# Patient Record
Sex: MALE | Race: WHITE | Employment: OTHER | URBAN - METROPOLITAN AREA
[De-identification: names, ages, dates, MRNs, and addresses within clinical notes are randomized per-mention and may not be internally consistent; named-entity substitution may affect disease eponyms.]

---

## 2021-04-12 ENCOUNTER — HOSPITAL ENCOUNTER (INPATIENT)
Age: 71
LOS: 4 days | Discharge: HOME | DRG: 293 | End: 2021-04-16
Attending: EMERGENCY MEDICINE | Admitting: INTERNAL MEDICINE
Payer: MEDICARE

## 2021-04-12 ENCOUNTER — APPOINTMENT (OUTPATIENT)
Dept: GENERAL RADIOLOGY | Age: 71
DRG: 293 | End: 2021-04-12
Payer: MEDICARE

## 2021-04-12 DIAGNOSIS — I48.91 ATRIAL FIBRILLATION WITH RAPID VENTRICULAR RESPONSE (HCC): Primary | ICD-10-CM

## 2021-04-12 DIAGNOSIS — J81.0 ACUTE PULMONARY EDEMA (HCC): ICD-10-CM

## 2021-04-12 LAB
A/G RATIO: 1 (ref 1.1–2.2)
ALBUMIN SERPL-MCNC: 3.4 G/DL (ref 3.4–5)
ALP BLD-CCNC: 104 U/L (ref 40–129)
ALT SERPL-CCNC: 49 U/L (ref 10–40)
ANION GAP SERPL CALCULATED.3IONS-SCNC: 14 MMOL/L (ref 3–16)
APTT: 32.2 SEC (ref 24.2–36.2)
AST SERPL-CCNC: 33 U/L (ref 15–37)
BASOPHILS ABSOLUTE: 0.1 K/UL (ref 0–0.2)
BASOPHILS RELATIVE PERCENT: 0.5 %
BILIRUB SERPL-MCNC: 0.8 MG/DL (ref 0–1)
BUN BLDV-MCNC: 10 MG/DL (ref 7–20)
CALCIUM SERPL-MCNC: 8.4 MG/DL (ref 8.3–10.6)
CHLORIDE BLD-SCNC: 105 MMOL/L (ref 99–110)
CO2: 21 MMOL/L (ref 21–32)
CREAT SERPL-MCNC: 0.7 MG/DL (ref 0.8–1.3)
EOSINOPHILS ABSOLUTE: 0.1 K/UL (ref 0–0.6)
EOSINOPHILS RELATIVE PERCENT: 1 %
GFR AFRICAN AMERICAN: >60
GFR NON-AFRICAN AMERICAN: >60
GLOBULIN: 3.3 G/DL
GLUCOSE BLD-MCNC: 144 MG/DL (ref 70–99)
HCT VFR BLD CALC: 42.6 % (ref 40.5–52.5)
HEMOGLOBIN: 14.7 G/DL (ref 13.5–17.5)
INR BLD: 1.1 (ref 0.86–1.14)
LACTIC ACID, SEPSIS: 1.3 MMOL/L (ref 0.4–1.9)
LV EF: 48 %
LVEF MODALITY: NORMAL
LYMPHOCYTES ABSOLUTE: 0.9 K/UL (ref 1–5.1)
LYMPHOCYTES RELATIVE PERCENT: 9.2 %
MCH RBC QN AUTO: 30.6 PG (ref 26–34)
MCHC RBC AUTO-ENTMCNC: 34.6 G/DL (ref 31–36)
MCV RBC AUTO: 88.6 FL (ref 80–100)
MONOCYTES ABSOLUTE: 1 K/UL (ref 0–1.3)
MONOCYTES RELATIVE PERCENT: 10 %
NEUTROPHILS ABSOLUTE: 7.6 K/UL (ref 1.7–7.7)
NEUTROPHILS RELATIVE PERCENT: 79.3 %
PDW BLD-RTO: 14.2 % (ref 12.4–15.4)
PLATELET # BLD: 169 K/UL (ref 135–450)
PMV BLD AUTO: 9.2 FL (ref 5–10.5)
POTASSIUM REFLEX MAGNESIUM: 4.4 MMOL/L (ref 3.5–5.1)
PRO-BNP: 4011 PG/ML (ref 0–124)
PROTHROMBIN TIME: 12.8 SEC (ref 10–13.2)
RBC # BLD: 4.82 M/UL (ref 4.2–5.9)
SARS-COV-2, NAAT: NOT DETECTED
SODIUM BLD-SCNC: 140 MMOL/L (ref 136–145)
TOTAL PROTEIN: 6.7 G/DL (ref 6.4–8.2)
TROPONIN: <0.01 NG/ML
TROPONIN: <0.01 NG/ML
TSH REFLEX FT4: 2.13 UIU/ML (ref 0.27–4.2)
WBC # BLD: 9.6 K/UL (ref 4–11)

## 2021-04-12 PROCEDURE — 93306 TTE W/DOPPLER COMPLETE: CPT

## 2021-04-12 PROCEDURE — 36415 COLL VENOUS BLD VENIPUNCTURE: CPT

## 2021-04-12 PROCEDURE — 96365 THER/PROPH/DIAG IV INF INIT: CPT

## 2021-04-12 PROCEDURE — 85025 COMPLETE CBC W/AUTO DIFF WBC: CPT

## 2021-04-12 PROCEDURE — 6360000002 HC RX W HCPCS: Performed by: INTERNAL MEDICINE

## 2021-04-12 PROCEDURE — 71046 X-RAY EXAM CHEST 2 VIEWS: CPT

## 2021-04-12 PROCEDURE — 96374 THER/PROPH/DIAG INJ IV PUSH: CPT

## 2021-04-12 PROCEDURE — 80053 COMPREHEN METABOLIC PANEL: CPT

## 2021-04-12 PROCEDURE — 84484 ASSAY OF TROPONIN QUANT: CPT

## 2021-04-12 PROCEDURE — 2500000003 HC RX 250 WO HCPCS: Performed by: INTERNAL MEDICINE

## 2021-04-12 PROCEDURE — 87040 BLOOD CULTURE FOR BACTERIA: CPT

## 2021-04-12 PROCEDURE — 87635 SARS-COV-2 COVID-19 AMP PRB: CPT

## 2021-04-12 PROCEDURE — 85610 PROTHROMBIN TIME: CPT

## 2021-04-12 PROCEDURE — 99223 1ST HOSP IP/OBS HIGH 75: CPT | Performed by: INTERNAL MEDICINE

## 2021-04-12 PROCEDURE — 83605 ASSAY OF LACTIC ACID: CPT

## 2021-04-12 PROCEDURE — 2500000003 HC RX 250 WO HCPCS: Performed by: EMERGENCY MEDICINE

## 2021-04-12 PROCEDURE — 94760 N-INVAS EAR/PLS OXIMETRY 1: CPT

## 2021-04-12 PROCEDURE — 6370000000 HC RX 637 (ALT 250 FOR IP): Performed by: INTERNAL MEDICINE

## 2021-04-12 PROCEDURE — 2580000003 HC RX 258: Performed by: EMERGENCY MEDICINE

## 2021-04-12 PROCEDURE — 83880 ASSAY OF NATRIURETIC PEPTIDE: CPT

## 2021-04-12 PROCEDURE — 2580000003 HC RX 258: Performed by: INTERNAL MEDICINE

## 2021-04-12 PROCEDURE — 99285 EMERGENCY DEPT VISIT HI MDM: CPT

## 2021-04-12 PROCEDURE — 93005 ELECTROCARDIOGRAM TRACING: CPT | Performed by: EMERGENCY MEDICINE

## 2021-04-12 PROCEDURE — 2060000000 HC ICU INTERMEDIATE R&B

## 2021-04-12 PROCEDURE — 96376 TX/PRO/DX INJ SAME DRUG ADON: CPT

## 2021-04-12 PROCEDURE — 84443 ASSAY THYROID STIM HORMONE: CPT

## 2021-04-12 PROCEDURE — 85730 THROMBOPLASTIN TIME PARTIAL: CPT

## 2021-04-12 RX ORDER — ACETAMINOPHEN 650 MG/1
650 SUPPOSITORY RECTAL EVERY 6 HOURS PRN
Status: DISCONTINUED | OUTPATIENT
Start: 2021-04-12 | End: 2021-04-16 | Stop reason: HOSPADM

## 2021-04-12 RX ORDER — POLYETHYLENE GLYCOL 3350 17 G/17G
17 POWDER, FOR SOLUTION ORAL DAILY PRN
Status: DISCONTINUED | OUTPATIENT
Start: 2021-04-12 | End: 2021-04-16 | Stop reason: HOSPADM

## 2021-04-12 RX ORDER — POTASSIUM CHLORIDE 7.45 MG/ML
10 INJECTION INTRAVENOUS PRN
Status: DISCONTINUED | OUTPATIENT
Start: 2021-04-12 | End: 2021-04-16 | Stop reason: HOSPADM

## 2021-04-12 RX ORDER — ACETAMINOPHEN 325 MG/1
650 TABLET ORAL EVERY 6 HOURS PRN
Status: DISCONTINUED | OUTPATIENT
Start: 2021-04-12 | End: 2021-04-16 | Stop reason: HOSPADM

## 2021-04-12 RX ORDER — SODIUM CHLORIDE 0.9 % (FLUSH) 0.9 %
5-40 SYRINGE (ML) INJECTION EVERY 12 HOURS SCHEDULED
Status: DISCONTINUED | OUTPATIENT
Start: 2021-04-12 | End: 2021-04-13

## 2021-04-12 RX ORDER — FUROSEMIDE 10 MG/ML
40 INJECTION INTRAMUSCULAR; INTRAVENOUS 2 TIMES DAILY
Status: DISCONTINUED | OUTPATIENT
Start: 2021-04-12 | End: 2021-04-13

## 2021-04-12 RX ORDER — MAGNESIUM SULFATE IN WATER 40 MG/ML
2000 INJECTION, SOLUTION INTRAVENOUS PRN
Status: DISCONTINUED | OUTPATIENT
Start: 2021-04-12 | End: 2021-04-16 | Stop reason: HOSPADM

## 2021-04-12 RX ORDER — METOPROLOL SUCCINATE 50 MG/1
50 TABLET, EXTENDED RELEASE ORAL 2 TIMES DAILY
Status: DISCONTINUED | OUTPATIENT
Start: 2021-04-13 | End: 2021-04-16

## 2021-04-12 RX ORDER — POTASSIUM CHLORIDE 20 MEQ/1
40 TABLET, EXTENDED RELEASE ORAL PRN
Status: DISCONTINUED | OUTPATIENT
Start: 2021-04-12 | End: 2021-04-16 | Stop reason: HOSPADM

## 2021-04-12 RX ORDER — PROMETHAZINE HYDROCHLORIDE 25 MG/1
12.5 TABLET ORAL EVERY 6 HOURS PRN
Status: DISCONTINUED | OUTPATIENT
Start: 2021-04-12 | End: 2021-04-16 | Stop reason: HOSPADM

## 2021-04-12 RX ORDER — FUROSEMIDE 10 MG/ML
20 INJECTION INTRAMUSCULAR; INTRAVENOUS ONCE
Status: COMPLETED | OUTPATIENT
Start: 2021-04-12 | End: 2021-04-12

## 2021-04-12 RX ORDER — SPIRONOLACTONE 25 MG/1
25 TABLET ORAL DAILY
Status: DISCONTINUED | OUTPATIENT
Start: 2021-04-12 | End: 2021-04-16 | Stop reason: HOSPADM

## 2021-04-12 RX ORDER — SODIUM CHLORIDE 0.9 % (FLUSH) 0.9 %
5-40 SYRINGE (ML) INJECTION PRN
Status: DISCONTINUED | OUTPATIENT
Start: 2021-04-12 | End: 2021-04-13

## 2021-04-12 RX ORDER — DILTIAZEM HYDROCHLORIDE 5 MG/ML
10 INJECTION INTRAVENOUS ONCE
Status: COMPLETED | OUTPATIENT
Start: 2021-04-12 | End: 2021-04-12

## 2021-04-12 RX ORDER — ONDANSETRON 2 MG/ML
4 INJECTION INTRAMUSCULAR; INTRAVENOUS EVERY 6 HOURS PRN
Status: DISCONTINUED | OUTPATIENT
Start: 2021-04-12 | End: 2021-04-16 | Stop reason: HOSPADM

## 2021-04-12 RX ORDER — SODIUM CHLORIDE 9 MG/ML
25 INJECTION, SOLUTION INTRAVENOUS PRN
Status: DISCONTINUED | OUTPATIENT
Start: 2021-04-12 | End: 2021-04-13

## 2021-04-12 RX ORDER — METOPROLOL SUCCINATE 50 MG/1
50 TABLET, EXTENDED RELEASE ORAL DAILY
Status: DISCONTINUED | OUTPATIENT
Start: 2021-04-12 | End: 2021-04-12

## 2021-04-12 RX ORDER — LISINOPRIL 20 MG/1
20 TABLET ORAL DAILY
Status: DISCONTINUED | OUTPATIENT
Start: 2021-04-12 | End: 2021-04-16 | Stop reason: HOSPADM

## 2021-04-12 RX ADMIN — FUROSEMIDE 20 MG: 10 INJECTION, SOLUTION INTRAMUSCULAR; INTRAVENOUS at 10:25

## 2021-04-12 RX ADMIN — SPIRONOLACTONE 25 MG: 25 TABLET ORAL at 15:56

## 2021-04-12 RX ADMIN — SODIUM CHLORIDE, PRESERVATIVE FREE 10 ML: 5 INJECTION INTRAVENOUS at 21:16

## 2021-04-12 RX ADMIN — METOPROLOL SUCCINATE 50 MG: 50 TABLET, EXTENDED RELEASE ORAL at 15:57

## 2021-04-12 RX ADMIN — DILTIAZEM HYDROCHLORIDE 5 MG/HR: 5 INJECTION INTRAVENOUS at 18:59

## 2021-04-12 RX ADMIN — DILTIAZEM HYDROCHLORIDE 10 MG: 5 INJECTION INTRAVENOUS at 06:25

## 2021-04-12 RX ADMIN — APIXABAN 5 MG: 5 TABLET, FILM COATED ORAL at 15:55

## 2021-04-12 RX ADMIN — APIXABAN 5 MG: 5 TABLET, FILM COATED ORAL at 21:16

## 2021-04-12 RX ADMIN — ENOXAPARIN SODIUM 40 MG: 40 INJECTION SUBCUTANEOUS at 10:25

## 2021-04-12 RX ADMIN — FUROSEMIDE 40 MG: 10 INJECTION, SOLUTION INTRAMUSCULAR; INTRAVENOUS at 15:55

## 2021-04-12 RX ADMIN — SODIUM CHLORIDE, PRESERVATIVE FREE 10 ML: 5 INJECTION INTRAVENOUS at 10:29

## 2021-04-12 RX ADMIN — DILTIAZEM HYDROCHLORIDE 10 MG/HR: 5 INJECTION INTRAVENOUS at 06:25

## 2021-04-12 RX ADMIN — LISINOPRIL 20 MG: 20 TABLET ORAL at 15:55

## 2021-04-12 ASSESSMENT — PAIN SCALES - GENERAL
PAINLEVEL_OUTOF10: 0

## 2021-04-12 ASSESSMENT — PAIN DESCRIPTION - DESCRIPTORS: DESCRIPTORS: SHOOTING

## 2021-04-12 ASSESSMENT — PAIN DESCRIPTION - LOCATION: LOCATION: HEAD

## 2021-04-12 NOTE — H&P
deformity, nasal mucosa and turbinates normal without polyps  Neck: supple and non-tender without mass, no thyromegaly or thyroid nodules, no cervical lymphadenopathy  Pulmonary/Chest: clear to auscultation bilaterally- no wheezes, rales or rhonchi, normal air movement, no respiratory distress  Cardiovascular: normal rate, irregular rhythm, normal S1 and S2, no murmurs, rubs, clicks, or gallops, Peripheral pulses good, Cap refill <3 sec, no carotid bruits  Abdomen: soft, non-tender, non-distended, normal bowel sounds, no masses or organomegaly  Extremities: no cyanosis, clubbing trace edema  Musculoskeletal: normal range of motion, no joint swelling, deformity or tenderness  Neurologic: reflexes normal and symmetric, no cranial nerve deficit, gait, coordination and speech normal      LABS:    CXR:  I have reviewed the CXR with the following interpretation: Basilar infiltrate    EKG: Not up in the system. Per ER note A. fib with RVR    CBC   Recent Labs     04/12/21  0602   WBC 9.6   HGB 14.7   HCT 42.6         RENAL  Recent Labs     04/12/21  0602      K 4.4      CO2 21   BUN 10   CREATININE 0.7*     LFT'S  Recent Labs     04/12/21  0602   AST 33   ALT 49*   BILITOT 0.8   ALKPHOS 104     COAG  No results for input(s): INR in the last 72 hours. CARDIAC ENZYMES  Recent Labs     04/12/21  0602   TROPONINI <0.01       U/A:  No results found for: NITRITE, COLORU, WBCUA, RBCUA, MUCUS, BACTERIA, CLARITYU, SPECGRAV, LEUKOCYTESUR, BLOODU, GLUCOSEU, AMORPHOUS    ABG  No results found for: YWP1HFT, BEART, A9NGIBWF, PHART, THGBART, THY5MHU, PO2ART, RMK1BIT    UA:No results for input(s): NITRITE, COLORU, PHUR, LABCAST, WBCUA, RBCUA, MUCUS, TRICHOMONAS, YEAST, BACTERIA, CLARITYU, SPECGRAV, LEUKOCYTESUR, UROBILINOGEN, BILIRUBINUR, BLOODU, GLUCOSEU, KETUA, AMORPHOUS in the last 72 hours. Microbiology:  No results for input(s): LABGRAM, LABANAE, ORG, CXSURG in the last 72 hours.   Nasal Culture: No results for input(s): ORG, MRSAPCR in the last 72 hours. Blood Culture: No results for input(s): BC, BLOODCULT2, ORG in the last 72 hours. Fungal Culture:   No results for input(s): FUNGSM in the last 72 hours. No results for input(s): FUNCXBLD in the last 72 hours. CSF Culture:  No results for input(s): COLORCSF, APPEARCSF, CFTUBE, CLOTCSF, WBCCSF, RBCCSF, NEUTCSF, NUMCELLSCSF, LYMPHSCSF, MONOCSF, GLUCCSF, VOLCSF in the last 72 hours. Respiratory Culture:  No results for input(s): Mauriec Banister in the last 72 hours. AFB:No results for input(s): AFBSMEAR in the last 72 hours. Urine Culture  No results for input(s): LABURIN in the last 72 hours. RADIOLOGY:    XR CHEST (2 VW)   Final Result   Bibasilar airspace disease most consistent with pneumonia. Previous medical records personally reviewed and analyzed         PHYSICIAN CERTIFICATION    I certify that Joseluis Giang is expected to be hospitalized for > 2  midnights based on the following assessment and plan:    ASSESSMENT/PLAN:  Active Hospital Problems    Diagnosis Date Noted    Atrial fibrillation with RVR ([facility] [address] [I48.91] 04/12/2021     New onset A. fib with RVR with CHF exacerbation  -Patient started on a Cardizem drip, will wean as tolerated  -Echo ordered to evaluate EF  -Anticoagulation to be decided after work-up is complete  -Shortness of breath possibly due to RVR but with elevated BNP will start IV Lasix to treat for possible CHF. Unclear what her ejection fraction is.   monitor DORIAN  -TSH  -Troponin    DVT Prophylaxis: lovenox  Diet: DIET CARDIAC; Daily Fluid Restriction: 1800 ml  Code Status: Full Code      Dispo -PCU       King Rubio MD  The note was completed using EMR. Every effort was made to ensure accuracy; however, inadvertent computerized transcription errors may be present. Thank you No primary care provider on file. for the opportunity to be involved in this patient's care.  If you have any questions or concerns please feel free to contact me at [phone].

## 2021-04-12 NOTE — PLAN OF CARE
Problem:  Activity:  Goal: Ability to tolerate increased activity will improve  Description: Ability to tolerate increased activity will improve  Outcome: Ongoing     Problem: Cardiac:  Goal: Ability to maintain an adequate cardiac output will improve  Description: Ability to maintain an adequate cardiac output will improve  Outcome: Ongoing  Goal: Complications related to the disease process, condition or treatment will be avoided or minimized  Description: Complications related to the disease process, condition or treatment will be avoided or minimized  Outcome: Ongoing     Problem: Coping:  Goal: Level of anxiety will decrease  Description: Level of anxiety will decrease  Outcome: Ongoing  Goal: General experience of comfort will improve  Description: General experience of comfort will improve  Outcome: Ongoing     Problem: Health Behavior:  Goal: Ability to manage health-related needs will improve  Description: Ability to manage health-related needs will improve  Outcome: Ongoing     Problem: Safety:  Goal: Ability to remain free from injury will improve  Description: Ability to remain free from injury will improve  Outcome: Ongoing  Goal: Will show no signs and symptoms of excessive bleeding  Description: Will show no signs and symptoms of excessive bleeding  Outcome: Ongoing

## 2021-04-12 NOTE — ED NOTES
Triage made by this RN in error. Complaint not foot.   Complaint SOB     Naga Casillas [facility]  04/12/21 0030

## 2021-04-12 NOTE — CARE COORDINATION
INITIAL CASE MANAGEMENT ASSESSMENT    Reviewed chart, met with patient to assess possible discharge needs. Explained Case Management role/services. Living Situation: Confirmed address, lives alone in 2 level house, 3 steps w/ railing to enter    ADLs: Independent     DME: None - has old equipment from spouse that passed aware - walker,cane    PT/OT Recs: Not ordered     Active Services: None     Transportation: Active  - family can transport home     Medications: Uses [facility] on [address] - no issues    PCP: Florian Sidhu (hasn't seen MD in years) - new [facility] PCP list given as pt interested in finding new PCP      HD/PD: n/a    PLAN/COMMENTS: Patient plans on returning home at discharge. Denies needs. Family can transport home. CM provided contact information for patient or family to call with any questions. CM will follow and assist as needed.   Electronically signed by Phyllis Georges RN Case Management [phone] on 4/12/2021 at 12:59 PM

## 2021-04-12 NOTE — ED PROVIDER NOTES
[address] [facility]      Pt Name: Saeed Akins  MRN: [id number]  [place] 1950  Date of evaluation: 4/12/2021  Provider: Nixon Lock MD    CHIEF COMPLAINT       Chief Complaint   Patient presents with    Shortness of Breath     sob for approx 2 days         HISTORY OF PRESENT ILLNESS   (Location/Symptom, Timing/Onset,Context/Setting, Quality, Duration, Modifying Factors, Severity)  Note limiting factors. Saeed Akins is a 72 y.o. male who presents to the emergency department for evaluation of shortness of breath. Patient reports that he has had some mild shortness of breath for the past several days since the beginning of the weekend. He states that the shortness of breath is actually worse when he is lying flat, but has become present throughout the day today. He walked into the fire house for assistance this morning because of his breathing. A squad brought him to the emergency department, and in route obtain a twelve-lead EKG which was consistent with atrial fibrillation. Patient denies any history of A. fib or any other medical problems but has not seen a physician for approximately 20 years. He denies any chest pain recently and has not had any heart palpitation. Denies any recent cough or fever. No abdominal pain, nausea or vomiting. NursingNotes were reviewed. REVIEW OF SYSTEMS    (2-9 systems for level 4, 10 or more for level 5)       Constitutional: No fever or chills. Eye: No visual disturbances. No eye pain. Ear/Nose/Mouth/Throat: No nasal congestion. No sore throat. Respiratory: No cough, shortness of breath, No sputum production. Orthopnea. Cardiovascular: No chest pain. No palpitations. Gastrointestinal: No abdominal pain. No nausea or vomiting  Genitourinary: No dysuria. No hematuria. Hematology/Lymphatics: No bleeding or bruising tendency. Immunologic: No malaise.  No swollen glands. Musculoskeletal: No back pain. No joint pain. Integumentary: No rash. No abrasions. Neurologic: No headache. No focal numbness or weakness. PAST MEDICAL HISTORY   No past medical history on file. SURGICALHISTORY     No past surgical history on file. CURRENT MEDICATIONS       Previous Medications    No medications on file       ALLERGIES     Patient has no known allergies. FAMILY HISTORY     No family history on file.        SOCIAL HISTORY       Social History     Socioeconomic History    Marital status: Single     Spouse name: Not on file    Number of children: Not on file    Years of education: Not on file    Highest education level: Not on file   Occupational History    Not on file   Social Needs    Financial resource strain: Not on file    Food insecurity     Worry: Not on file     Inability: Not on file    Transportation needs     Medical: Not on file     Non-medical: Not on file   Tobacco Use    Smoking status: Not on file   Substance and Sexual Activity    Alcohol use: Not on file    Drug use: Not on file    Sexual activity: Not on file   Lifestyle    Physical activity     Days per week: Not on file     Minutes per session: Not on file    Stress: Not on file   Relationships    Social connections     Talks on phone: Not on file     Gets together: Not on file     Attends [employer] service: Not on file     Active member of club or organization: Not on file     Attends meetings of clubs or organizations: Not on file     Relationship status: Not on file    Intimate partner violence     Fear of current or ex partner: Not on file     Emotionally abused: Not on file     Physically abused: Not on file     Forced sexual activity: Not on file   Other Topics Concern    Not on file   Social History Narrative    Not on file       SCREENINGS    Jeffry Coma Scale  Eye Opening: Spontaneous  Best Verbal Response: Oriented  Best Motor Response: Obeys commands  Kent Coma Scale Score: 15        PHYSICAL EXAM    (up to 7 for level 4, 8 or more for level 5)     ED Triage Vitals   BP Temp Temp src Pulse Resp SpO2 Height Weight   -- -- -- -- -- -- -- --       General: Alert and oriented, No acute distress. Eye: Normal conjunctiva. Pupils equal and reactive. HENT: Oral mucosa is moist.  Respiratory: Lungs are clear to auscultation, Respirations are non-labored. Cardiovascular: Normal rate, Regular rhythm. Gastrointestinal: Soft, Non-tender, Non-distended. Musculoskeletal: No lower extremity swelling. Integumentary: Warm, Dry. Neurologic: Alert, Oriented, No focal defects. Psychiatric: Cooperative. DIAGNOSTIC RESULTS     EKG: All EKG's are interpreted by the Emergency Department Physician who either signs or Co-signsthis chart in the absence of a cardiologist.    Per my interpretation:    Electrocardiogram (ECG) 4/12/2021 5:55 AM  RATE: 152 bpm  RHYTHM: Atrial fibrillation with RVR  AXIS: normal  INTERVALS: normal  ST-T WAVE CHANGES: No evidence of ST segment elevation or T-wave inversion, nonspecific ST abnormality  Prior for comparison - none    RADIOLOGY:   Non-plain filmimages such as CT, Ultrasound and MRI are read by the radiologist. Plain radiographic images are visualized and preliminarily interpreted by the emergency physician with the below findings:      Interpretation per the Radiologist below, if available at the time ofthis note:    XR CHEST (2 VW)   Final Result   Bibasilar airspace disease most consistent with pneumonia.                ED BEDSIDE ULTRASOUND:   Performed by ED Physician - none    LABS:  Labs Reviewed   CBC WITH AUTO DIFFERENTIAL - Abnormal; Notable for the following components:       Result Value    Lymphocytes Absolute 0.9 (*)     All other components within normal limits    Narrative:     Performed at:  [address] [address]   Phone [phone]   COMPREHENSIVE METABOLIC PANEL W/ REFLEX TO MG FOR LOW K - Abnormal; Notable for the following components:    Glucose 144 (*)     CREATININE 0.7 (*)     Albumin/Globulin Ratio 1.0 (*)     ALT 49 (*)     All other components within normal limits    Narrative:     Performed at:  [facility]  [address] [facility] [address]   Phone [phone]   BRAIN NATRIURETIC PEPTIDE - Abnormal; Notable for the following components:    Pro-BNP 4,011 (*)     All other components within normal limits    Narrative:     Performed at:  [facility]  [address] [place], [place] [address]   Phone [phone]   COVID-19, RAPID   CULTURE, BLOOD 1   TROPONIN    Narrative:     Performed at:  [facility]  [address] [facility] [address]   Phone [phone]   PROTIME-INR   APTT   LACTATE, SEPSIS   LACTATE, SEPSIS   PROCALCITONIN       All other labs were within normal range or not returned as of this dictation. EMERGENCY DEPARTMENT COURSE and DIFFERENTIAL DIAGNOSIS/MDM:   Vitals:    Vitals:    04/12/21 0558 04/12/21 0602 04/12/21 0603   BP:  (!) 156/103 (!) 156/103   Pulse: 133 126    Resp: 20 23    Temp: 99.1 °F (37.3 °C)     TempSrc: Oral     SpO2: 94% 94%    Weight: 272 lb 11.3 oz (123.7 kg)     Height: 6' 5\" (1.956 m)           Medical decision making: This is a 71-year-old male with no known past medical history but who does not follow with primary care and presents for evaluation of worsening shortness of breath for the past several days, found to have atrial fibrillation by EMS. On exam, patient is well-appearing, has a low-grade temperature of 99.1, noted to be tachycardic with regular rhythm, ranging 140s to 150s initially. Normal blood pressure and oxygen saturation. EKG is consistent with atrial fibrillation with rapid ventricular response. Patient is started on diltiazem drip and bolus. Work-up was initiated.   CBC is unremarkable. CMP is reassuring. Troponin is negative. BNP is elevated to 4000. Chest x-ray shows bibasilar airspace disease disease which radiology interprets as consistent with pneumonia however on my interpretation of x-ray, coupled with clinical examination I suspect this is more likely to be pulmonary edema related to his atrial fibrillation with RVR. Patient denies any recent cough or fever. I did however add blood culture, lactate, procalcitonin, and rapid Covid test was sent. Will defer IV fluids and antibiotics till results of the study. The patient had adequate control of heart rate on the diltiazem drip. [id number] score of 0, though limited because history of CHF and hypertension are unknown, however will defer to inpatient team for anticoagulation. Otherwise, patient will be admitted for newly diagnosed atrial fibrillation with RVR. Patient was updated and agreeable plan of care, stable at the time of admission. CRITICAL CARE TIME   Total Critical Care time was 32 minutes, excluding separately reportable procedures. There was a high probability of clinically significant/life threatening deterioration in the patient's condition which required my urgent intervention. CONSULTS:  None    PROCEDURES:  Unless otherwise noted below, none         FINAL IMPRESSION      1. Atrial fibrillation with rapid ventricular response ([address]    2. Acute pulmonary edema (HCC)          DISPOSITION/PLAN   DISPOSITION  Admit      PATIENT REFERRED TO:  No follow-up provider specified.     DISCHARGE MEDICATIONS:  New Prescriptions    No medications on file          (Please note that portions of this note were completed with a voice recognition program.Efforts were made to edit the dictations but occasionally words are mis-transcribed.)    Annette Park MD (electronically signed)  Attending Emergency Physician        Annette Park MD  04/12/21 7470

## 2021-04-12 NOTE — CONSULTS
[address]  Cardiology Consult Note        CC: Atrial fibrillation with RVR      HPI:   This is a 72 y.o. male came to the ER for evaluation of shortness of breath that has become progressively worse since the last few days. His breathing is worse when he lays down and when he exerts himself. He went to the fire house and the squad brought him here. EKG showed A. fib    In the ER his EKG showed heart rate in the 150 range with A. fib. He was started on Cardizem drip. Chest x-ray showed CHF.        Social History     Tobacco Use    Smoking status: Never Smoker    Smokeless tobacco: Never Used   Substance Use Topics    Alcohol use: Not on file    Drug use: Not on file     No Known Allergies   sodium chloride flush  5-40 mL Intravenous 2 times per day    enoxaparin  40 mg Subcutaneous Daily       Review of Systems -   Constitutional: Negative for weight gain/loss; malaise, fever  Respiratory: Negative for Asthma;  cough and hemoptysis  Cardiovascular: Negative for palpitations,dizziness   Gastrointestinal: Negative for abd.pain; constipation/diarrhea;    Genitourinary: Negative for stones; hematuria; frequency hesitancy  Integumentt: Negative for rash or pruritis  Hematologic/lymphatic: Negative for blood dyscrasia; leukemia/lymphoma  Musculoskeletal: Negative for Connective tissue disease  Neurological:  Negative for Seizure   Behavioral/Psych:Negative for Bipolar disorder, Schizophrenia; Dementia  Endocrine: negative for thyroid, parathyroid disease      Intake/Output Summary (Last 24 hours) at 4/12/2021 1342  Last data filed at 4/12/2021 1213  Gross per 24 hour   Intake --   Output 1200 ml   Net -1200 ml       Physical Examination:    BP (!) 143/90   Pulse 83   Temp 97.6 °F (36.4 °C) (Oral)   Resp 18   Ht 6' 5\" (1.956 m)   Wt 276 lb 0.3 oz (125.2 kg)   SpO2 94%   BMI 32.73 kg/m²    HEENT:  Face: Atraumatic, Conjunctiva: Pink; non icteric,  Mucous Memb:  Moist, No thyromegaly or Lymphadenopathy  Respiratory:  Resp Assessment: normal, Resp Auscultation: Basilar rales  Cardiovascular: Auscultation: nl S1 & S2, Palpation:  Nl PMI; No heaves or thrills, JVP:  normal  Abdomen: Soft, non-tender, Normal bowel sounds,  No organomegaly  Extremities: No Cyanosis or Clubbing; Edema none  Neurological: Oriented to time, place, and person, Non-anxious  Psychiatric: Normal mood and affect  Skin: Warm and dry,  No rash seen      Current Facility-Administered Medications: dilTIAZem 125 mg in dextrose 5 % 125 mL infusion, 5-15 mg/hr, Intravenous, Continuous  sodium chloride flush 0.9 % injection 5-40 mL, 5-40 mL, Intravenous, 2 times per day  sodium chloride flush 0.9 % injection 5-40 mL, 5-40 mL, Intravenous, PRN  0.9 % sodium chloride infusion, 25 mL, Intravenous, PRN  enoxaparin (LOVENOX) injection 40 mg, 40 mg, Subcutaneous, Daily  promethazine (PHENERGAN) tablet 12.5 mg, 12.5 mg, Oral, Q6H PRN **OR** ondansetron (ZOFRAN) injection 4 mg, 4 mg, Intravenous, Q6H PRN  polyethylene glycol (GLYCOLAX) packet 17 g, 17 g, Oral, Daily PRN  acetaminophen (TYLENOL) tablet 650 mg, 650 mg, Oral, Q6H PRN **OR** acetaminophen (TYLENOL) suppository 650 mg, 650 mg, Rectal, Q6H PRN  potassium chloride (KLOR-CON M) extended release tablet 40 mEq, 40 mEq, Oral, PRN **OR** potassium bicarb-citric acid (EFFER-K) effervescent tablet 40 mEq, 40 mEq, Oral, PRN **OR** potassium chloride 10 mEq/100 mL IVPB (Peripheral Line), 10 mEq, Intravenous, PRN  magnesium sulfate 2000 mg in 50 mL IVPB premix, 2,000 mg, Intravenous, PRN      Labs:   Recent Labs     04/12/21  0602   WBC 9.6   HGB 14.7   HCT 42.6        Recent Labs     04/12/21  0602      K 4.4   CO2 21   BUN 10   CREATININE 0.7*   GLUCOSE 144*     No results for input(s): BNP in the last 72 hours.   Recent Labs     04/12/21  0602   PROTIME 12.8   INR 1.10     Recent Labs     04/12/21  0602   APTT 32.2     Recent Labs     04/12/21  0602 04/12/21  1017   TROPONINI <0.01 <0.01     No results found for: HDL, LDLDIRECT, LDLCALC, TRIG  Recent Labs     04/12/21  0602   AST 33   ALT 49*   LABALBU 3.4         EKG:   A. fib with RVR    Chest X-Ray:  CHF    ECHO: 4/12/2021   Suboptimal image quality. Normal left ventricular size and wall thickness. LVEF is difficult to assess  due to arrhythmia but appears mildly reduced with global systolic  dysfunction. LVEF 45-50%. Unable to exclude regional wall motion abnormalities. Indeterminate diastolic function due to atrial fibrillation. The right ventricle is normal in size and function. Mild mitral regurgitation is present.       ASSESSMENT AND PLAN:      26-year-old patient presenting with shortness of breath orthopnea PND  Has A. fib with RVR on admission  Chest x-ray evidence of CHF  proBNP is 4011  Echo showed ejection fraction of 45 to 50% with no major valvular abnormalities    Looks like the primary event may be A. 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

## 2021-04-12 NOTE — PROGRESS NOTES
Patient arrived to room 5255 from ED. Transported by stretcher. Pt ambulated to the bed independently. Telemetry applied to patient and verified with [facility]. Box #[id number]. Vital signs taken, view flow sheets. Patient alert and oriented. Patient oriented to room and use of call light. Patient educated to call before getting up. Call light and personal belongings in reach. Bed alarm on. Plan of care reviewed with patient. Patient denied any further needs and questions at this time. Talked to patient about his heart rate and let him know we will be monitoring his heart rate and the drip he is on and the heart doctor will see him as well.

## 2021-04-12 NOTE — ED NOTES
Bed: B-  Expected date: 4/12/21  Expected time: 5:43 AM  Means of arrival: [facility]  Comments:  Sob; a fib rvr     [address] [facility]  04/12/21 8886

## 2021-04-12 NOTE — PROGRESS NOTES
Pt to echo via stretcher. Gave patient all meds and explained the use of each and given information sheets. Also sister in room and explained to her as well. No further questions.

## 2021-04-13 PROBLEM — I50.21 ACUTE SYSTOLIC HEART FAILURE (HCC): Status: ACTIVE | Noted: 2021-04-13

## 2021-04-13 LAB
ANION GAP SERPL CALCULATED.3IONS-SCNC: 7 MMOL/L (ref 3–16)
BUN BLDV-MCNC: 11 MG/DL (ref 7–20)
CALCIUM SERPL-MCNC: 8 MG/DL (ref 8.3–10.6)
CHLORIDE BLD-SCNC: 106 MMOL/L (ref 99–110)
CO2: 27 MMOL/L (ref 21–32)
CREAT SERPL-MCNC: 0.8 MG/DL (ref 0.8–1.3)
EKG ATRIAL RATE: 134 BPM
EKG DIAGNOSIS: NORMAL
EKG Q-T INTERVAL: 292 MS
EKG QRS DURATION: 76 MS
EKG QTC CALCULATION (BAZETT): 464 MS
EKG R AXIS: 51 DEGREES
EKG T AXIS: 16 DEGREES
EKG VENTRICULAR RATE: 152 BPM
GFR AFRICAN AMERICAN: >60
GFR NON-AFRICAN AMERICAN: >60
GLUCOSE BLD-MCNC: 133 MG/DL (ref 70–99)
HCT VFR BLD CALC: 39.7 % (ref 40.5–52.5)
HEMOGLOBIN: 13.7 G/DL (ref 13.5–17.5)
MCH RBC QN AUTO: 30.5 PG (ref 26–34)
MCHC RBC AUTO-ENTMCNC: 34.5 G/DL (ref 31–36)
MCV RBC AUTO: 88.4 FL (ref 80–100)
PDW BLD-RTO: 13.7 % (ref 12.4–15.4)
PLATELET # BLD: 164 K/UL (ref 135–450)
PMV BLD AUTO: 9.4 FL (ref 5–10.5)
POTASSIUM REFLEX MAGNESIUM: 4.1 MMOL/L (ref 3.5–5.1)
RBC # BLD: 4.49 M/UL (ref 4.2–5.9)
SODIUM BLD-SCNC: 140 MMOL/L (ref 136–145)
WBC # BLD: 6.2 K/UL (ref 4–11)

## 2021-04-13 PROCEDURE — 6360000002 HC RX W HCPCS: Performed by: NURSE PRACTITIONER

## 2021-04-13 PROCEDURE — 2060000000 HC ICU INTERMEDIATE R&B

## 2021-04-13 PROCEDURE — 2580000003 HC RX 258: Performed by: NURSE PRACTITIONER

## 2021-04-13 PROCEDURE — 6360000002 HC RX W HCPCS: Performed by: INTERNAL MEDICINE

## 2021-04-13 PROCEDURE — 2580000003 HC RX 258: Performed by: INTERNAL MEDICINE

## 2021-04-13 PROCEDURE — 6370000000 HC RX 637 (ALT 250 FOR IP): Performed by: INTERNAL MEDICINE

## 2021-04-13 PROCEDURE — 80048 BASIC METABOLIC PNL TOTAL CA: CPT

## 2021-04-13 PROCEDURE — 94760 N-INVAS EAR/PLS OXIMETRY 1: CPT

## 2021-04-13 PROCEDURE — 93010 ELECTROCARDIOGRAM REPORT: CPT | Performed by: INTERNAL MEDICINE

## 2021-04-13 PROCEDURE — 36415 COLL VENOUS BLD VENIPUNCTURE: CPT

## 2021-04-13 PROCEDURE — 85027 COMPLETE CBC AUTOMATED: CPT

## 2021-04-13 PROCEDURE — 99232 SBSQ HOSP IP/OBS MODERATE 35: CPT | Performed by: NURSE PRACTITIONER

## 2021-04-13 RX ORDER — SODIUM CHLORIDE 9 MG/ML
25 INJECTION, SOLUTION INTRAVENOUS PRN
Status: DISCONTINUED | OUTPATIENT
Start: 2021-04-13 | End: 2021-04-13

## 2021-04-13 RX ORDER — SODIUM CHLORIDE 0.9 % (FLUSH) 0.9 %
5-40 SYRINGE (ML) INJECTION EVERY 12 HOURS SCHEDULED
Status: DISCONTINUED | OUTPATIENT
Start: 2021-04-13 | End: 2021-04-13

## 2021-04-13 RX ORDER — SODIUM CHLORIDE 9 MG/ML
25 INJECTION, SOLUTION INTRAVENOUS PRN
Status: DISCONTINUED | OUTPATIENT
Start: 2021-04-13 | End: 2021-04-16 | Stop reason: HOSPADM

## 2021-04-13 RX ORDER — SODIUM CHLORIDE 0.9 % (FLUSH) 0.9 %
5-40 SYRINGE (ML) INJECTION PRN
Status: DISCONTINUED | OUTPATIENT
Start: 2021-04-13 | End: 2021-04-13

## 2021-04-13 RX ORDER — FUROSEMIDE 10 MG/ML
40 INJECTION INTRAMUSCULAR; INTRAVENOUS ONCE
Status: COMPLETED | OUTPATIENT
Start: 2021-04-13 | End: 2021-04-13

## 2021-04-13 RX ORDER — SODIUM CHLORIDE 0.9 % (FLUSH) 0.9 %
5-40 SYRINGE (ML) INJECTION EVERY 12 HOURS SCHEDULED
Status: DISCONTINUED | OUTPATIENT
Start: 2021-04-13 | End: 2021-04-16 | Stop reason: HOSPADM

## 2021-04-13 RX ORDER — FUROSEMIDE 10 MG/ML
40 INJECTION INTRAMUSCULAR; INTRAVENOUS DAILY
Status: DISCONTINUED | OUTPATIENT
Start: 2021-04-14 | End: 2021-04-16 | Stop reason: HOSPADM

## 2021-04-13 RX ORDER — SODIUM CHLORIDE 0.9 % (FLUSH) 0.9 %
5-40 SYRINGE (ML) INJECTION PRN
Status: DISCONTINUED | OUTPATIENT
Start: 2021-04-13 | End: 2021-04-16 | Stop reason: HOSPADM

## 2021-04-13 RX ADMIN — METOPROLOL SUCCINATE 50 MG: 50 TABLET, EXTENDED RELEASE ORAL at 20:50

## 2021-04-13 RX ADMIN — SODIUM CHLORIDE, PRESERVATIVE FREE 10 ML: 5 INJECTION INTRAVENOUS at 08:23

## 2021-04-13 RX ADMIN — Medication 10 ML: at 20:50

## 2021-04-13 RX ADMIN — LISINOPRIL 20 MG: 20 TABLET ORAL at 12:18

## 2021-04-13 RX ADMIN — APIXABAN 5 MG: 5 TABLET, FILM COATED ORAL at 20:50

## 2021-04-13 RX ADMIN — SPIRONOLACTONE 25 MG: 25 TABLET ORAL at 12:18

## 2021-04-13 RX ADMIN — FUROSEMIDE 40 MG: 10 INJECTION, SOLUTION INTRAMUSCULAR; INTRAVENOUS at 17:06

## 2021-04-13 RX ADMIN — METOPROLOL SUCCINATE 50 MG: 50 TABLET, EXTENDED RELEASE ORAL at 08:22

## 2021-04-13 RX ADMIN — FUROSEMIDE 40 MG: 10 INJECTION, SOLUTION INTRAMUSCULAR; INTRAVENOUS at 08:22

## 2021-04-13 RX ADMIN — APIXABAN 5 MG: 5 TABLET, FILM COATED ORAL at 08:22

## 2021-04-13 ASSESSMENT — PAIN SCALES - GENERAL
PAINLEVEL_OUTOF10: 0

## 2021-04-13 NOTE — PLAN OF CARE
Problem: OXYGENATION/RESPIRATORY FUNCTION  Goal: Patient will maintain patent airway  4/13/2021 1333 by Kelly Wright RN  Outcome: Ongoing  Note:       Problem: FLUID AND ELECTROLYTE IMBALANCE  Goal: Fluid and electrolyte balance are achieved/maintained  4/13/2021 1333 by Kelly Wright RN  Outcome: Ongoing  Note:       Problem: ACTIVITY INTOLERANCE/IMPAIRED MOBILITY  Goal: Mobility/activity is maintained at optimum level for patient  4/13/2021 1333 by Kelly Wright RN  Outcome: Ongoing  Note:

## 2021-04-13 NOTE — CONSULTS
[facility]  HEART FAILURE PROGRAM      NAME:  Montrell Lamas  MEDICAL RECORD NUMBER:  [id number]  AGE: 72 y.o. GENDER: male  : 1950  TODAY'S DATE:  2021    Subjective:     VISIT TYPE: evaluation     ADMITTING PHYSICIAN:  Ingrid Duverney, MD    PAST MEDICAL HISTORY    No past medical history on file.     SOCIAL HISTORY    Social History     Tobacco Use    Smoking status: Never Smoker    Smokeless tobacco: Never Used   Substance Use Topics    Alcohol use: Not on file    Drug use: Not on file       ALLERGIES    No Known Allergies    MEDICATIONS  Scheduled Meds:   [START ON 2021] furosemide  40 mg Intravenous Daily    sodium chloride flush  5-40 mL Intravenous 2 times per day    apixaban  5 mg Oral BID    spironolactone  25 mg Oral Daily    lisinopril  20 mg Oral Daily    metoprolol succinate  50 mg Oral BID       ADMIT DATE: 2021      Objective:     ADMISSION DIAGNOSIS:   Atrial fibrillation with RVR ([facility]) [I48.91]     BP (!) 135/91   Pulse 79   Temp 98.3 °F (36.8 °C) (Oral)   Resp 16   Ht 6' 5\" (1.956 m)   Wt 269 lb 6.4 oz (122.2 kg)   SpO2 93%   BMI 31.95 kg/m²     ADMIT:  Weight: 272 lb 11.3 oz (123.7 kg)    TODAY: Weight: 269 lb 6.4 oz (122.2 kg)    Wt Readings from Last 25 Encounters:   21 269 lb 6.4 oz (122.2 kg)          Intake/Output Summary (Last 24 hours) at 2021 1935  Last data filed at 2021 1846  Gross per 24 hour   Intake 1200 ml   Output 2650 ml   Net -1450 ml       LABS  BMP:   Lab Results   Component Value Date     2021    K 4.1 2021     2021    CO2 27 2021    BUN 11 2021    LABALBU 3.4 2021    CREATININE 0.8 2021    CALCIUM 8.0 2021    GFRAA >60 2021    LABGLOM >60 2021    GLUCOSE 133 2021     CBC:   Recent Labs     21  0602 21  0559   WBC 9.6 6.2   HGB 14.7 13.7   HCT 42.6 39.7*   MCV 88.6 88.4    164     BNP: No results found for: BNP    ECHOCARDIOGRAM: 04/12/2021   Summary   The patient is in atrial fibrillation. Suboptimal image quality. Normal left ventricular size and wall thickness. LVEF is difficult to assess   due to arrhythmia but appears mildly reduced with global systolic   dysfunction. LV ejection fraction is visually estimated to be 45-50%. Unable to exclude regional wall motion abnormalities. Indeterminate diastolic function due to atrial fibrillation. The right ventricle is normal in size and function. Mild mitral regurgitation is present. Assessment:     CONSULTS:   IP CONSULT TO CARDIOLOGY  IP CONSULT TO SPIRITUAL SERVICES  IP CONSULT TO HEART FAILURE NURSE/COORDINATOR    Patient has a CARDIOLOGY CONSULT: Yes      Patient taking an ACEI/ARB:  Yes       Patient taking a BETA BLOCKER:  Yes    SCALE AVAILABLE:  Yes     EDUCATION STATUS: Patient   [x]  Provided both written and verbal education on Heart Failure signs/symptoms. [x]  Provided instructions on daily medications. [x]  Provided instructions to monitor and record weight daily. [x]  Provided instructions to call if weight increases 3 lbs in one day or 5 lbs in one week. [x]  Received verbal acknowledgment/understanding of Heart Failure related causes. [x]  Provided instructions on how to maintain a low sodium diet. []  Provided recommendations for smoking cessation programs  [x]  Provided recommendations on activity and exercise    [x]  Other: HF RN consult received from Dr Mark Mayers. Chart reviewed. Pt presented to ED via EMS with c/o SOB. He had driven himself to fire station and transported from there. He endorses leg edema \"for a while\" and orthopnea. En route, EKG revealed AF RVR rate 140-150. In ED, pt was treated with Cardizem gtt and IV Lasix. Pt has not seen an MD in > 20 years. He essentially has no medical history and takes no prescription medications.   He has a PCP who he has not seen \"in a long long time\" per his self report and no established cardiologist.     Dellar Goldmann on admission was 4011 with Cr 0.7. CXR showed edema but ED MD felt it was more indicative of pulm edema. Weight 272# (ED) and 276# (floor). Pt was treated with IV bolus Lasix. Cardiology was consulted. Pt remains in AF but with better rate control  Cardizem gtt has been stopped. Plan is for JAMIA / DCCV 4/14/21. I met with patient in his room. He is up to chair, on room air, and appears comfortable at rest and with light conversation. I introduced myself and my role in his care. He is agreeable to spend time with me for HF education. Pt endorses above summary of events leading to admission. He adds sx have \"been off and on for about a year\". He shares he is quite active -- he mows grass, has construction projects and still works. He states he has had \"zero energy\" which is frustrating to him. He shares he knew things were slowly worsening but they had rapidly progressed over 2-3 days prior to admission. He shares he \"was doing the wrong thing\". He shares he had purposefully increased his fluid intake thinking it was \"the healthy thing to do -- every doctor always tells you to drink water\". He now knows he will have a fluid restriction moving forward. We reviewed his echo results. He has LOTS of questions and wants to know definitions of terms he has heard, treatment plans, etc.  He is very engaged and very interested in learning all he can. He acknowledges he has been fortunate with good health but is aware of the seriousness of this diagnosis. We discussed the general definition of systolic HF and atrial fibrillation. We discussed loss of atrial kick and how HF and AF are intertwined and sometimes difficult to discern which came first.  He is understandably anxious about procedure tomorrow but shares he spoke with a friend who had the same procedure but it was unsuccessful - \"it lasted a day\".   We discussed why DCCV was delayed while he was being \"tuned up / primed\" for cardioversion and that hopefully it was increase the chance of success / lasting effect. We also discussed how people can live with AF and how other treatments are also options -- ie ablation. He is \"willing to do whatever they tell me\". Praise given for positive attitude and willingness to make necessary changes. We discussed the importance of weight monitoring in HF management. Pt has a scale at home and had recently started weighing daily. He shares he does it just before getting in shower each AM.  He states it was 260 then drifted down to 254#. He says he then gained ~ 12# in 2-3 weeks. \"I couldn't figure out what was going on because it wasn't because I was eating more\". He shares his appetite was \"off\" but yet he kept gaining weight. I provided weight log and corresponding AHA HF zones. He already logs his weight and I urged him to continue using whatever method works for him. He was quite interested in reviewing the zones. He endorses all of the yellow zone items except cough. He shares he had \"a lot of the red zone\" and now voices understanding of why he should call 911. He states \"I just didn't want lights and sirens [place]\" but now knows it is that serious. Pt is already aware of dietary changes necessary for HF management. We reviewed the rationale of sodium and fluid restriction and the recommendations of each. Pt shares he is  for last 1 1/2 years - his wife  of lung cancer. He shares she suffered from fluid retention because of kidney damage and was on dialysis for short time. He is aware of reading food labels and being attentive to sodium content. Pt shares he \"tries to cook now that I don't have any one to do it for me\" but admits it is a mix of scratch and \"easy\".   He shares he makes chicken / veg soup but is aware he should review his ingredient list. He is concerned about totally giving up added salt as he \"loves hard boiled eggs and must have salt\". We discussed how he gets to \"spend\" his 2000mg allotment however he wishes so if he want hard boiled eggs or \"steak has to have salt\", he would just need to balance into his other daily choices. I reiterated importance of using zone tool to guide him. He voices understanding of importance. Pt shares he was drinking 42 oz of water each morning \"on purpose\" trying to ensure he was hydrated. He also drinks a small pot of coffee each AM equal to \"one and half of my mugs\". I urged him to carefully track his intake for a couple days and see what his actual intake is WITHOUT the purposeful increase. I urged him to measure with a measuring cup to see his exact coffee amount. We discussed tips on how to offset thirst and use fruits /vegs to snack on. He especially liked the idea of freezing grapes for a snack. We reviewed his concern of excessive perspiration in summer / mowing grass. He quickly grasped how he could \"replace what I sweat out\" and up his upper limit slightly to offset loss. We reviewed the rationale of medications. He is not accustomed to taking any meds at all so I encouraged him to get a pill  to not only organize but also act as a reminder of taking his meds. He feels that would be of great benefit to him. We discussed timing of diuretic and how he can adjust it if needed - just not to skip a dose unless directed by MD.  We reviewed s/sx of DEhydration as well and importance of reporting those concerns as well. We discussed required 7 day follow up and he requests it to be with Dr Jose J Morgan instead of NP. He is accepting of both being involved in his care but he especially liked Dr Jose J Morgan and just feels he should meet with him first.  He is agreeable to NP appts in the future if needed. Pt remarked several times about how much he appreciated the information / handouts.   He is very motivated to learn as much as he can and make whatever lifestyle changes necessary. He is understanding changes are lifelong as HF has no cure. I encouraged him to call Resource Line but reiterated importance of calling [facility] office line with yellow zone /medication concerns. He voices understanding. We discussed how he could possibly be discharged tomorrow post DCCV. HF RN will continue to follow and assist with transition of care at discharge. CURRENT DIET: DIET CARDIAC; Low Sodium (2 GM); Daily Fluid Restriction: 1500 ml  Diet NPO Time Specified Exceptions are: Sips with Meds  Diet NPO Time Specified Exceptions are: Sips with [address]. Titles and material given:  Yes   [x]  What is Heart Failure? [x]  Heart Failure: Warning Signs of a Flare-Up  [x]  Heart Failure: Making Changes to Your Diet  [x]  Heart Failure: Medications to Help Your Heart   [x]  Other: weight log, AHA HF zones sheet, [employer], Sodium content in Foods, Fast Food Nutrition Guide, [employer] HF Nutrition guide, [facility] brochure    PATIENT/CAREGIVER TEACHING:   Level of patient/caregiver understanding able to:   [x] Verbalize understanding   [] Demonstrate understanding       [x] Teach back        [x] Needs reinforcement     []  Other:      TEACHING TIME:  70 minutes       Plan:       DISCHARGE PLAN:  Placement for patient upon discharge: home with support   Hospice Care:  no  Code Status: Full Code  Discharge appointment scheduled: Yes     RECOMMENDATIONS:   [x]  Encourage to call Heart Failure Resource Line with any questions or concerns. [x]  Educate further Mr. Reaves's on fluid restriction 48 oz- 64 oz during inpatient stay so he can understand how to measure intake at home. [x]  Continue to educate on S/S of Heart Failure.   [x]  Emphasize daily weights, diet, and if changes, to call Heart Failure Resource Line  [x]  Other:  Cardiac Rehab referral: pt declines as not covered by insurance in setting of preserved [facility]  [address] referral : pt declines but is aware he could enroll later and / or utilize infusion services if ordered           Electronically signed by Gokul Jon RN, BSN CHFN  on 4/13/2021 at 7:35 PM

## 2021-04-13 NOTE — PROGRESS NOTES
Progress Note  Admit Date: 4/12/2021      PCP: Alphonse Jean-Baptiste     CC: F/U for A. fib with RVR    Days in hospital:  1    SUBJECTIVE / Interval History:  Patient feels much better. Just tired. Able to lie down with no shortness of breath. Leg swelling improved    > 169   Neg 1.8 L      Allergies  Patient has no known allergies. Medications    Scheduled Meds:   sodium chloride flush  5-40 mL Intravenous 2 times per day    enoxaparin  40 mg Subcutaneous Daily    apixaban  5 mg Oral BID    furosemide  40 mg Intravenous BID    spironolactone  25 mg Oral Daily    lisinopril  20 mg Oral Daily    metoprolol succinate  50 mg Oral BID     Continuous Infusions:   sodium chloride      dilTIAZem 5 mg/hr (04/12/21 1859)       PRN Meds:  sodium chloride flush, sodium chloride, promethazine **OR** ondansetron, polyethylene glycol, acetaminophen **OR** acetaminophen, potassium chloride **OR** potassium alternative oral replacement **OR** potassium chloride, magnesium sulfate    Vitals    /83   Pulse 98   Temp 98 °F (36.7 °C) (Oral)   Resp 18   Ht 6' 5\" (1.956 m)   Wt 269 lb 6.4 oz (122.2 kg)   SpO2 93%   BMI 31.95 kg/m²     Exam:    Gen: No distress. Eyes: PERRL. No sclera icterus. No conjunctival injection. ENT: No discharge. Pharynx clear. External appearance of ears and nose normal.  Neck: Trachea midline. No obvious mass. Resp: No accessory muscle use. No crackles. No wheezes. No rhonchi. No dullness on percussion. CV: Regular rate. Regular rhythm. No murmur or rub. No edema. GI: Non-tender. Non-distended. No hernia. Skin: Warm, dry, normal texture and turgor. No nodule on exposed extremities. Lymph: No cervical LAD. No supraclavicular LAD. M/S: No cyanosis. No clubbing. No joint deformity. Neuro: Moves all four extremities. CN 2-12 tested, no defect noted. Psych: Oriented x 3. No anxiety. Awake. Alert. Intact judgement and insight.     Data    LABS  CBC:   Recent Labs 04/12/21  0602 04/13/21  0559   WBC 9.6 6.2   HGB 14.7 13.7   HCT 42.6 39.7*   MCV 88.6 88.4    164     BMP:   Recent Labs     04/12/21  0602 04/13/21  0559    140   K 4.4 4.1    106   CO2 21 27   BUN 10 11   CREATININE 0.7* 0.8   GLUCOSE 144* 133*     POC GLUCOSE:  No results for input(s): POCGLU in the last 72 hours. LIVER PROFILE:   Recent Labs     04/12/21  0602   AST 33   ALT 49*   LABALBU 3.4   BILITOT 0.8   ALKPHOS 104     PT/INR:   Recent Labs     04/12/21 0602   PROTIME 12.8   INR 1.10     APTT:   Recent Labs     04/12/21 0602   APTT 32.2     UA:No results for input(s): NITRITE, COLORU, PHUR, LABCAST, WBCUA, RBCUA, MUCUS, TRICHOMONAS, YEAST, BACTERIA, CLARITYU, SPECGRAV, LEUKOCYTESUR, UROBILINOGEN, BILIRUBINUR, BLOODU, GLUCOSEU, KETUA, AMORPHOUS in the last 72 hours. Microbiology:  Wound Culture: No results for input(s): WNDABS, ORG in the last 72 hours. Invalid input(s):  LABGRAM  Nasal Culture: No results for input(s): ORG, MRSAPCR in the last 72 hours. Blood Culture:   Recent Labs     04/12/21  0647   BC No Growth to date. Any change in status will be called. Fungal Culture:   No results for input(s): FUNGSM in the last 72 hours. No results for input(s): FUNCXBLD in the last 72 hours. CSF Culture:  No results for input(s): COLORCSF, APPEARCSF, CFTUBE, CLOTCSF, WBCCSF, RBCCSF, NEUTCSF, NUMCELLSCSF, LYMPHSCSF, MONOCSF, GLUCCSF, VOLCSF in the last 72 hours. Respiratory Culture:  No results for input(s): Eligah Hodgkins in the last 72 hours. AFB:No results for input(s): AFBSMEAR in the last 72 hours. Urine Culture  No results for input(s): LABURIN in the last 72 hours. RADIOLOGY:    XR CHEST (2 VW)   Final Result   Bibasilar airspace disease most consistent with pneumonia.              CONSULTS:    IP CONSULT TO CARDIOLOGY  IP CONSULT TO SPIRITUAL SERVICES  IP CONSULT TO HEART FAILURE NURSE/COORDINATOR    ASSESSMENT AND PLAN:      Active Problems:    Atrial fibrillation with RVR ([facility] [address]  Resolved Problems:    * No resolved hospital problems. *    Patient is a 81-year-old male with no past medical history who presented with worsening shortness of breath 3 to 4 days prior to admission. He was admitted with A. fib with RVR and CHF exacerbation. Patient was started on a Cardizem drip. A. fib with RVR  -Wean Cardizem as tolerated. Started on metoprolol  -Eliquis started  -Possible cardioversion tomorrow    Acute systolic and diastolic heart failure  -Echo shows an EF of 37% with diastolic dysfunction  -Continue diuresis with IV Lasix  -On lisinopril and Aldactone      DVT Prophylaxis:Eliquis  Diet: DIET CARDIAC; Low Sodium (2 GM); Daily Fluid Restriction: 1500 ml  Code Status: Full Code        Discharge plan -continue care    The patient and / or the family were informed of the results of any tests, a time was given to answer questions, a plan was proposed and they agreed with plan. Discussed with consulting physicians, nursing and social work     The note was completed using EMR. Every effort was made to ensure accuracy; however, inadvertent computerized transcription errors may be present.        Billy Black MD

## 2021-04-13 NOTE — PLAN OF CARE
Problem: Activity:  Goal: Ability to tolerate increased activity will improve  Description: Ability to tolerate increased activity will improve  4/13/2021 0027 by Don Lopez RN  Outcome: Ongoing     Problem:  Activity:  Goal: Expression of feelings of increased energy will increase  Description: Expression of feelings of increased energy will increase  4/13/2021 0027 by Don Lopez RN  Outcome: Ongoing     Problem: Cardiac:  Goal: Ability to maintain an adequate cardiac output will improve  Description: Ability to maintain an adequate cardiac output will improve  4/13/2021 0027 by Don Lopez RN  Outcome: Ongoing     Problem: Cardiac:  Goal: Complications related to the disease process, condition or treatment will be avoided or minimized  Description: Complications related to the disease process, condition or treatment will be avoided or minimized  4/13/2021 0027 by Don Lopez RN  Outcome: Ongoing     Problem: Coping:  Goal: Level of anxiety will decrease  Description: Level of anxiety will decrease  4/13/2021 0027 by Don Lopez RN  Outcome: Ongoing     Problem: Coping:  Goal: General experience of comfort will improve  Description: General experience of comfort will improve  4/13/2021 0027 by Don Lopez RN  Outcome: Ongoing     Problem: Health Behavior:  Goal: Ability to manage health-related needs will improve  Description: Ability to manage health-related needs will improve  4/13/2021 0027 by Don Lopez RN  Outcome: Ongoing     Problem: Safety:  Goal: Ability to remain free from injury will improve  Description: Ability to remain free from injury will improve  4/13/2021 0027 by Don Lopez RN  Outcome: Ongoing     Problem: Safety:  Goal: Will show no signs and symptoms of excessive bleeding  Description: Will show no signs and symptoms of excessive bleeding  4/13/2021 0027 by Don Lopez RN  Outcome: Ongoing     Problem: OXYGENATION/RESPIRATORY FUNCTION  Goal: Patient will maintain patent airway  Outcome: Ongoing     Problem: OXYGENATION/RESPIRATORY FUNCTION  Goal: Patient will achieve/maintain normal respiratory rate/effort  Description: Respiratory rate and effort will be within normal limits for the patient  Outcome: Ongoing     Problem: HEMODYNAMIC STATUS  Goal: Patient has stable vital signs and fluid balance  Outcome: Ongoing     Problem: FLUID AND ELECTROLYTE IMBALANCE  Goal: Fluid and electrolyte balance are achieved/maintained  Outcome: Ongoing     Problem: ACTIVITY INTOLERANCE/IMPAIRED MOBILITY  Goal: Mobility/activity is maintained at optimum level for patient  Outcome: Ongoing

## 2021-04-13 NOTE — FLOWSHEET NOTE
04/13/21 1158   Encounter Summary   Services provided to: Patient  (and friend)   Referral/Consult From: Nurse   Continue Visiting   (visit, prayer, AD discusion 4/13 CL)   Complexity of Encounter Low   Length of Encounter 15 minutes   Spiritual/Sikh   Type Spiritual support   Assessment Calm; Approachable; Hopeful;Coping   Intervention Active listening;Explored feelings, thoughts, concerns;Prayer   Outcome Engaged in conversation;Coping; Hopeful   Advance Directives (For Healthcare)   Healthcare Directive Yes, patient has an advance directive for healthcare treatment   Type of Healthcare Directive Durable power of  for health care;Living will   Copy in Chart No, copy requested from other (See comment)  (pt will give a copy to doc after discharge)   Electronically signed by Nelli Mason on 4/13/2021 at 12:00 PM

## 2021-04-13 NOTE — PROGRESS NOTES
[address]   Daily Progress Note      Admit Date:  4/12/2021    CC: SOB    HPI:   Montrell Lamas is a 72 y.o. male with PMH - no known medical hx    Presented to [facility] with progressively worsening SOB for several days. Associated with orthopnea and exertion. EKG indicated AF RVR . CXR showed white density - pulm edema vs Pna? BNP elevated 4011. ECHO with mod low EF 45-50%. He was started on cardizem gtt and lasix. He remains in AF HR . He is ambulating in the room. Denies SOB, CP, LH, dizzy, syncope, palpitations. Continues with 1+ pitting edema BLE with some abd distention and generalized fatigue. Hx snoring and daytime fatigue. 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 Review of Systems:   General: Denies fever, chills, +fatigue  Skin: Denies skin changes, rash, itching, lesions.   HEENT: Denies headache, dizziness, vision changes, nosebleeds, sore throat, nasal drainage  RESP: Denies cough, sputum, dyspnea, wheeze, snoring  CARD: Denies palpitations,  Murmur, chest pain  GI:Denies nausea, vomiting, heartburn, loss of appetite, change in bowels  : Denies frequency, pain, incontinence, polyuria  VASC: Denies claudication, leg cramps, clots  MUSC/SKEL: Denies pain, stiffness, arthritis  PSYCH: Denies anxiety, depression, stress  NEURO: Denies numbness, tingling, weakness,change in mood or memory  HEME: Denies abn bruising, bleeding, anemia  ENDO: Denies intolerance to heat, cold, excessive thirst or hunger, hx thyroid disease    Objective:   /83   Pulse 98   Temp 98 °F (36.7 °C) (Oral)   Resp 18   Ht 6' 5\" (1.956 m)   Wt 269 lb 6.4 oz (122.2 kg)   SpO2 93%   BMI 31.95 kg/m²           Intake/Output Summary (Last 24 hours) at 4/13/2021 0927  Last data filed at 4/13/2021 1447  Gross per 24 hour   Intake 1320 ml   Output 2825 ml   Net -1505 ml     I/O since adm: Neg 1.8L    WEIGHT:Admit Weight: 272 lb 11.3 oz (123.7 kg)         Today  Weight: 269 lb 6.4 oz (122.2 kg)   DRY WEIGHT: 254 lbs  Wt Readings from Last 3 Encounters:   04/13/21 269 lb 6.4 oz (122.2 kg)       Physical Exam:  GEN: Appears well, no acute distress  SKIN: Pink, warm, dry. Nails without clubbing. HEENT: PERRLA. Normocephalic, atraumatic. Neck supple. No adenopathy. LUNG: AP diameter normal. Clear bilateral. No wheeze, rales, or ronchi. Respiratory effort normal.  HEART: S1S2 A/R. No JVD. No carotid bruit. No murmur, rub or gallop. ABD: Soft, nontender. +BS X 4 quads. No hepatomegaly. EXT: Radial and pedal pulses 2+ and symmetric. Without varicosities. No edema. MUSCSKEL: Good ROM X4 extremities. No deformity. NEURO: A/O X3. Calm and cooperative. Telemetry: AF HR 90-100s    Medications:    sodium chloride flush  5-40 mL Intravenous 2 times per day    enoxaparin  40 mg Subcutaneous Daily    apixaban  5 mg Oral BID    furosemide  40 mg Intravenous BID    spironolactone  25 mg Oral Daily    lisinopril  20 mg Oral Daily    metoprolol succinate  50 mg Oral BID      sodium chloride      dilTIAZem 5 mg/hr (04/12/21 1859)     sodium chloride flush, sodium chloride, promethazine **OR** ondansetron, polyethylene glycol, acetaminophen **OR** acetaminophen, potassium chloride **OR** potassium alternative oral replacement **OR** potassium chloride, magnesium sulfate    Lab Data: I have reviewed all labs below today.    CBC:   Recent Labs     04/12/21  0602 04/13/21  0559   WBC 9.6 6.2   HGB 14.7 13.7   HCT 42.6 39.7*   MCV 88.6 88.4    164     BMP:   Recent Labs     04/12/21  0602 04/13/21  0559    140   K 4.4 4.1    106   CO2 21 27   BUN 10 11   CREATININE 0.7* 0.8     GLUCOSE:   Recent Labs     04/12/21  0602 04/13/21  0559   GLUCOSE 144* 133*     LIVER PROFILE:   Lab Results   Component Value Date    AST 33 04/12/2021    ALT 49 04/12/2021    LABALBU 3.4 04/12/2021    BILITOT 0.8 04/12/2021    ALKPHOS 104 04/12/2021     PT/INR:   Lab Results   Component Value Date    PROTIME 12.8 04/12/2021    INR 1.10 04/12/2021     APTT:   Lab Results   Component Value Date    APTT 32.2 04/12/2021     Pro-BNP:    Lab Results   Component Value Date    PROBNP 4,011 04/12/2021     Parameters:   > 450 pg/mL under age 48  > 900 pg/mL ages 54-65  > 1800 pg/mL over age 76    ENZYMES:  Lab Results   Component Value Date    TROPONINI <0.01 04/12/2021     FASTING LIPID PANEL:No results found for: CHOL, HDL, LDLCALC, TRIG    Diagnostics:    EKG:   Atrial fibrillation with rapid ventricular response with premature ventricular or aberrantly conducted complexesNonspecific ST and T wave abnormalityAbnormal ECGNo previous ECGs available    ECHO: 4/12/2021   Suboptimal image quality.   Normal left ventricular size and wall thickness. LVEF is difficult to assess  due to arrhythmia but appears mildly reduced with global systolic  dysfunction.    LVEF 45-50%.  Unable to exclude regional wall motion abnormalities.   Indeterminate diastolic function due to atrial fibrillation.   The right ventricle is normal in size and function.   Mild mitral regurgitation is present. Stress Test:     Cardiac Angiography:     CXR 4/12/2021:  Impression   Bibasilar airspace disease most consistent with pneumonia. Assessment/Plan:    1.) Acute on chronic systolic heart failure, mod reduced EF 45-50%: Remains slightly hypervolemic. Neg 1.8L, wt down 3 lbs. SOB resolved. Continues with BLE pitting edema, abd distention and fatigue. NYHA Class III   Stage C  Diuretic: lasix 40mg IV BID, changed to daily by Dr. Freya Vences today. Will give a 2nd dose today. Beta Blocker: Toprol XL  ACEi/ARB/ARNI: lisinopril  Aldosterone Antagonist: spironolactone  SGLT2 Inhibitor: OP  2gm Na diet, daily weight, 64 oz fluid restriction  Avoid NSAIDS and other nephrotoxic meds  Cardiac Rehab:   ICD: Not indicated for EF>35%  [facility] Referral:   HF RN referral for education  Check lipid panel    2.) New onset AF with RVR: HR controlled. Cardizem gtt stopped. CHADSVASC-  2   HASBLED-  Thromboembolic risk reduction: eliquis  Rate Control/ Rhythm Control: Toprol XL  Plan for JAMIA/Cardioverson tomorrow.     Electronically signed by JETT Lopez CNP on 4/13/2021 at 9:27 AM

## 2021-04-14 ENCOUNTER — APPOINTMENT (OUTPATIENT)
Dept: CARDIAC CATH/INVASIVE PROCEDURES | Age: 71
DRG: 293 | End: 2021-04-14
Payer: MEDICARE

## 2021-04-14 LAB
ANION GAP SERPL CALCULATED.3IONS-SCNC: 10 MMOL/L (ref 3–16)
BUN BLDV-MCNC: 17 MG/DL (ref 7–20)
CALCIUM SERPL-MCNC: 8.3 MG/DL (ref 8.3–10.6)
CHLORIDE BLD-SCNC: 105 MMOL/L (ref 99–110)
CHOLESTEROL, TOTAL: 146 MG/DL (ref 0–199)
CO2: 23 MMOL/L (ref 21–32)
CREAT SERPL-MCNC: 0.8 MG/DL (ref 0.8–1.3)
GFR AFRICAN AMERICAN: >60
GFR NON-AFRICAN AMERICAN: >60
GLUCOSE BLD-MCNC: 114 MG/DL (ref 70–99)
HDLC SERPL-MCNC: 35 MG/DL (ref 40–60)
LDL CHOLESTEROL CALCULATED: 93 MG/DL
POTASSIUM REFLEX MAGNESIUM: 4.1 MMOL/L (ref 3.5–5.1)
SODIUM BLD-SCNC: 138 MMOL/L (ref 136–145)
TRIGL SERPL-MCNC: 90 MG/DL (ref 0–150)
VLDLC SERPL CALC-MCNC: 18 MG/DL

## 2021-04-14 PROCEDURE — 2500000003 HC RX 250 WO HCPCS

## 2021-04-14 PROCEDURE — 92960 CARDIOVERSION ELECTRIC EXT: CPT | Performed by: INTERNAL MEDICINE

## 2021-04-14 PROCEDURE — 2580000003 HC RX 258

## 2021-04-14 PROCEDURE — 5A2204Z RESTORATION OF CARDIAC RHYTHM, SINGLE: ICD-10-PCS | Performed by: INTERNAL MEDICINE

## 2021-04-14 PROCEDURE — 92960 CARDIOVERSION ELECTRIC EXT: CPT

## 2021-04-14 PROCEDURE — 36415 COLL VENOUS BLD VENIPUNCTURE: CPT

## 2021-04-14 PROCEDURE — 2580000003 HC RX 258: Performed by: NURSE PRACTITIONER

## 2021-04-14 PROCEDURE — 6370000000 HC RX 637 (ALT 250 FOR IP): Performed by: INTERNAL MEDICINE

## 2021-04-14 PROCEDURE — 6370000000 HC RX 637 (ALT 250 FOR IP)

## 2021-04-14 PROCEDURE — 80048 BASIC METABOLIC PNL TOTAL CA: CPT

## 2021-04-14 PROCEDURE — 6360000002 HC RX W HCPCS: Performed by: NURSE PRACTITIONER

## 2021-04-14 PROCEDURE — 6360000002 HC RX W HCPCS: Performed by: INTERNAL MEDICINE

## 2021-04-14 PROCEDURE — 2060000000 HC ICU INTERMEDIATE R&B

## 2021-04-14 PROCEDURE — B245ZZ4 ULTRASONOGRAPHY OF LEFT HEART, TRANSESOPHAGEAL: ICD-10-PCS | Performed by: INTERNAL MEDICINE

## 2021-04-14 PROCEDURE — 99232 SBSQ HOSP IP/OBS MODERATE 35: CPT | Performed by: NURSE PRACTITIONER

## 2021-04-14 PROCEDURE — 80061 LIPID PANEL: CPT

## 2021-04-14 PROCEDURE — 93312 ECHO TRANSESOPHAGEAL: CPT

## 2021-04-14 PROCEDURE — 93325 DOPPLER ECHO COLOR FLOW MAPG: CPT

## 2021-04-14 PROCEDURE — 6360000002 HC RX W HCPCS

## 2021-04-14 PROCEDURE — 93005 ELECTROCARDIOGRAM TRACING: CPT | Performed by: INTERNAL MEDICINE

## 2021-04-14 RX ORDER — FUROSEMIDE 10 MG/ML
40 INJECTION INTRAMUSCULAR; INTRAVENOUS ONCE
Status: COMPLETED | OUTPATIENT
Start: 2021-04-14 | End: 2021-04-14

## 2021-04-14 RX ADMIN — METOPROLOL SUCCINATE 50 MG: 50 TABLET, EXTENDED RELEASE ORAL at 20:59

## 2021-04-14 RX ADMIN — APIXABAN 5 MG: 5 TABLET, FILM COATED ORAL at 20:59

## 2021-04-14 RX ADMIN — FUROSEMIDE 40 MG: 10 INJECTION, SOLUTION INTRAMUSCULAR; INTRAVENOUS at 08:40

## 2021-04-14 RX ADMIN — Medication 10 ML: at 08:40

## 2021-04-14 RX ADMIN — METOPROLOL SUCCINATE 50 MG: 50 TABLET, EXTENDED RELEASE ORAL at 08:40

## 2021-04-14 RX ADMIN — Medication 10 ML: at 20:59

## 2021-04-14 RX ADMIN — APIXABAN 5 MG: 5 TABLET, FILM COATED ORAL at 08:40

## 2021-04-14 RX ADMIN — SPIRONOLACTONE 25 MG: 25 TABLET ORAL at 08:40

## 2021-04-14 RX ADMIN — LISINOPRIL 20 MG: 20 TABLET ORAL at 08:40

## 2021-04-14 RX ADMIN — FUROSEMIDE 40 MG: 10 INJECTION, SOLUTION INTRAMUSCULAR; INTRAVENOUS at 16:37

## 2021-04-14 ASSESSMENT — PAIN SCALES - GENERAL: PAINLEVEL_OUTOF10: 0

## 2021-04-14 NOTE — PROCEDURES
External Cardioversion     Procedures performed:     IV sedation. Trans-esophageal echocardiography  External Electrical cardioversion      Indication of the procedure: Atrial fibrillation     Patient atrial fibrillation/flutter       Details of procedure: The patient was brought to the cath lab area in a fasting and non-sedated state. The risks, benefits and alternatives of the procedure were discussed with the patient. The patient opted to proceed with the procedure. Written informed consent was signed and placed in the chart. A timeout protocol was completed to identify the patient and the procedure being performed. IV sedation was provided with IV Versed, Fentanyl initially and JAMIA was performed which did not show any JOBY/LA clot/thrombus. Full JAMIA reports will be dictated. Then we used brevital for sedation and electrical DC cardioversion was perfomred using 200J, synchronized shock. Patient was converted to sinus rhythm. The patient tolerated the procedure well and there were no complications. Patient was transported to the holding area in stable condition. Conclusion:     Successful external DC cardioversion of atrial fibrillation. Plan: Will continue with anticoagulation therapy. Follow up in the office in 2-4 weeks.      Marilu Hill MD [address] [facility]   [employer]   (C): [phone]  [facility]): [phone]

## 2021-04-14 NOTE — PROGRESS NOTES
[facility]   Daily Progress Note      Admit Date:  4/12/2021    CC: SOB    HPI:   Mortimer Gutter is a 72 y.o. male with PMH - no known medical hx    Presented to [facility] with progressively worsening SOB for several days. Associated with orthopnea and exertion. EKG indicated AF RVR . CXR showed white density - pulm edema vs Pna? BNP elevated 4011. ECHO with mod low EF 45-50%. He was started on cardizem gtt and lasix. Underwent JAMIA cardioversion today with Dr. Logan Lackey. Now in NSR HR 60s. He is a little drowsy after the procedure but arouses and is A/O X3. Denies SOB, CP, LH, dizzy, syncope, palpitations. He does continue with pedal/ankle edema. Hx snoring and daytime fatigue. 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 Review of Systems:   General: Denies fever, chills, +fatigue  Skin: Denies skin changes, rash, itching, lesions.   HEENT: Denies headache, dizziness, vision changes, nosebleeds, sore throat, nasal drainage  RESP: Denies cough, sputum, dyspnea, wheeze, snoring  CARD: Denies palpitations,  Murmur, chest pain  GI:Denies nausea, vomiting, heartburn, loss of appetite, change in bowels  : Denies frequency, pain, incontinence, polyuria  VASC: Denies claudication, leg cramps, clots  MUSC/SKEL: Denies pain, stiffness, arthritis  PSYCH: Denies anxiety, depression, stress  NEURO: Denies numbness, tingling, weakness,change in mood or memory  HEME: Denies abn bruising, bleeding, anemia  ENDO: Denies intolerance to heat, cold, excessive thirst or hunger, hx thyroid disease    Objective:   BP (!) 140/87   Pulse 90   Temp 98 °F (36.7 °C) (Oral)   Resp 16   Ht 6' 5\" (1.956 m)   Wt 263 lb 10.7 oz (119.6 kg)   SpO2 94%   BMI 31.27 kg/m²           Intake/Output Summary (Last 24 hours) at 4/14/2021 0850  Last data filed at 4/14/2021 0847  Gross per 24 hour   Intake 940 ml   Output 2550 ml   Net -1610 ml     I/O since adm: Neg 3.4L    WEIGHT:Admit Weight: 272 lb 11.3 oz (123.7 kg)         Today

## 2021-04-14 NOTE — PROGRESS NOTES
0830- Pt heart rate up to 110-140's AFIB. [facility] made aware. No new orders at this time. Pt and family at bedside concerned. Pt scheduled for cardioversion at 1030. Pt and family educated on how the cardioversion will help with pt HR and rhythm. Pt and family VU. Denies further needs at this time, will continue to monitor.        Electronically signed by Dakota Albright RN on 4/14/2021 at 9:33 AM

## 2021-04-14 NOTE — PROGRESS NOTES
paroxysmal atrial fibrillation.     Query created by: Nathaniel Palafox on 4/14/2021 8:44 AM      Electronically signed by:  Adelaida Donald MD 4/14/2021 1:46 PM

## 2021-04-14 NOTE — PROGRESS NOTES
Progress Note  Admit Date: 4/12/2021      PCP: Binta Ruelas     CC: F/U for A. fib with RVR    Days in hospital:  2    SUBJECTIVE / Interval History:  Patient feels better. Heart rate in sinus at present post cardioversion    > 263  Neg 3 L      Allergies  Patient has no known allergies. Medications    Scheduled Meds:   furosemide  40 mg Intravenous Daily    sodium chloride flush  5-40 mL Intravenous 2 times per day    apixaban  5 mg Oral BID    spironolactone  25 mg Oral Daily    lisinopril  20 mg Oral Daily    metoprolol succinate  50 mg Oral BID     Continuous Infusions:   sodium chloride      dilTIAZem Stopped (04/13/21 1333)       PRN Meds:  sodium chloride flush, sodium chloride, promethazine **OR** ondansetron, polyethylene glycol, acetaminophen **OR** acetaminophen, potassium chloride **OR** potassium alternative oral replacement **OR** potassium chloride, magnesium sulfate    Vitals    BP (!) 140/87   Pulse 142   Temp 98 °F (36.7 °C) (Oral)   Resp 16   Ht 6' 5\" (1.956 m)   Wt 263 lb 10.7 oz (119.6 kg)   SpO2 94%   BMI 31.27 kg/m²     Exam:    Gen: No distress. Eyes: PERRL. No sclera icterus. No conjunctival injection. ENT: No discharge. Pharynx clear. External appearance of ears and nose normal.  Neck: Trachea midline. No obvious mass. Resp: No accessory muscle use. No crackles. No wheezes. No rhonchi. No dullness on percussion. CV: Regular rate. Regular rhythm. No murmur or rub. 2+ edema. GI: Non-tender. Non-distended. No hernia. Skin: Warm, dry, normal texture and turgor. No nodule on exposed extremities. Lymph: No cervical LAD. No supraclavicular LAD. M/S: No cyanosis. No clubbing. No joint deformity. Neuro: Moves all four extremities. CN 2-12 tested, no defect noted. Psych: Oriented x 3. No anxiety. Awake. Alert. Intact judgement and insight.     Data    LABS  CBC:   Recent Labs     04/12/21  0602 04/13/21  0559   WBC 9.6 6.2   HGB 14.7 13.7   HCT 42.6 39.7*   MCV 88.6 88.4    164     BMP:   Recent Labs     04/12/21  0602 04/13/21  0559 04/14/21  0510    140 138   K 4.4 4.1 4.1    106 105   CO2 21 27 23   BUN 10 11 17   CREATININE 0.7* 0.8 0.8   GLUCOSE 144* 133* 114*     POC GLUCOSE:  No results for input(s): POCGLU in the last 72 hours. LIVER PROFILE:   Recent Labs     04/12/21  0602   AST 33   ALT 49*   LABALBU 3.4   BILITOT 0.8   ALKPHOS 104     PT/INR:   Recent Labs     04/12/21  0602   PROTIME 12.8   INR 1.10     APTT:   Recent Labs     04/12/21 0602   APTT 32.2     UA:No results for input(s): NITRITE, COLORU, PHUR, LABCAST, WBCUA, RBCUA, MUCUS, TRICHOMONAS, YEAST, BACTERIA, CLARITYU, SPECGRAV, LEUKOCYTESUR, UROBILINOGEN, BILIRUBINUR, BLOODU, GLUCOSEU, KETUA, AMORPHOUS in the last 72 hours. Microbiology:  Wound Culture: No results for input(s): WNDABS, ORG in the last 72 hours. Invalid input(s):  LABGRAM  Nasal Culture: No results for input(s): ORG, MRSAPCR in the last 72 hours. Blood Culture:   Recent Labs     04/12/21  0647   BC No Growth to date. Any change in status will be called. Fungal Culture:   No results for input(s): FUNGSM in the last 72 hours. No results for input(s): FUNCXBLD in the last 72 hours. CSF Culture:  No results for input(s): COLORCSF, APPEARCSF, CFTUBE, CLOTCSF, WBCCSF, RBCCSF, NEUTCSF, NUMCELLSCSF, LYMPHSCSF, MONOCSF, GLUCCSF, VOLCSF in the last 72 hours. Respiratory Culture:  No results for input(s): Shane Heller in the last 72 hours. AFB:No results for input(s): AFBSMEAR in the last 72 hours. Urine Culture  No results for input(s): LABURIN in the last 72 hours. RADIOLOGY:    XR CHEST (2 VW)   Final Result   Bibasilar airspace disease most consistent with pneumonia.              CONSULTS:    IP CONSULT TO CARDIOLOGY  IP CONSULT TO SPIRITUAL SERVICES  IP CONSULT TO HEART FAILURE NURSE/COORDINATOR    ASSESSMENT AND PLAN:      Active Problems:    Atrial fibrillation with RVR ([address]    Acute systolic heart failure ([facility] [address]  Resolved Problems:    * No resolved hospital problems. *    Patient is a 59-year-old male with no past medical history who presented with worsening shortness of breath 3 to 4 days prior to admission. He was admitted with A. fib with RVR and CHF exacerbation. Patient was started on a Cardizem drip. A. fib with RVR-now in sinus post cardioversion  - on metoprolol  -Eliquis started      Acute systolic and diastolic heart failure-continue diuresis, still fluid overloaded  -Echo shows an EF of 85% with diastolic dysfunction  -Continue diuresis with IV Lasix  -On lisinopril and Aldactone  -Baseline weight around 255 pounds      DVT Prophylaxis:Eliquis  Diet: Diet NPO Time Specified Exceptions are: Sips with Meds  Code Status: Full Code        Discharge plan -continue care    The patient and / or the family were informed of the results of any tests, a time was given to answer questions, a plan was proposed and they agreed with plan. Discussed with consulting physicians, nursing and social work     The note was completed using EMR. Every effort was made to ensure accuracy; however, inadvertent computerized transcription errors may be present.        Ami Ortiz MD

## 2021-04-14 NOTE — PROGRESS NOTES
Pt transferred back in room from cardioversion. Family at bedside. MD spoke with both pt and family after procedure. Vitals WNL, HR 71 NSR. Pt denies A&Ox4 and denies any pain. Pt and family updated on plan of care. No further needs at this time.        Electronically signed by Magdiel Wharton RN on 4/14/2021 at 11:46 AM

## 2021-04-15 LAB
ANION GAP SERPL CALCULATED.3IONS-SCNC: 7 MMOL/L (ref 3–16)
BUN BLDV-MCNC: 17 MG/DL (ref 7–20)
CALCIUM SERPL-MCNC: 8.5 MG/DL (ref 8.3–10.6)
CHLORIDE BLD-SCNC: 105 MMOL/L (ref 99–110)
CO2: 30 MMOL/L (ref 21–32)
CREAT SERPL-MCNC: 0.8 MG/DL (ref 0.8–1.3)
EKG ATRIAL RATE: 68 BPM
EKG DIAGNOSIS: NORMAL
EKG P AXIS: 8 DEGREES
EKG P-R INTERVAL: 144 MS
EKG Q-T INTERVAL: 432 MS
EKG QRS DURATION: 86 MS
EKG QTC CALCULATION (BAZETT): 459 MS
EKG R AXIS: -3 DEGREES
EKG T AXIS: -9 DEGREES
EKG VENTRICULAR RATE: 68 BPM
GFR AFRICAN AMERICAN: >60
GFR NON-AFRICAN AMERICAN: >60
GLUCOSE BLD-MCNC: 107 MG/DL (ref 70–99)
POTASSIUM REFLEX MAGNESIUM: 4.5 MMOL/L (ref 3.5–5.1)
SODIUM BLD-SCNC: 142 MMOL/L (ref 136–145)

## 2021-04-15 PROCEDURE — 6370000000 HC RX 637 (ALT 250 FOR IP): Performed by: INTERNAL MEDICINE

## 2021-04-15 PROCEDURE — 80048 BASIC METABOLIC PNL TOTAL CA: CPT

## 2021-04-15 PROCEDURE — 6360000002 HC RX W HCPCS: Performed by: INTERNAL MEDICINE

## 2021-04-15 PROCEDURE — 93010 ELECTROCARDIOGRAM REPORT: CPT | Performed by: INTERNAL MEDICINE

## 2021-04-15 PROCEDURE — 2580000003 HC RX 258: Performed by: NURSE PRACTITIONER

## 2021-04-15 PROCEDURE — 94761 N-INVAS EAR/PLS OXIMETRY MLT: CPT

## 2021-04-15 PROCEDURE — 2060000000 HC ICU INTERMEDIATE R&B

## 2021-04-15 PROCEDURE — 36415 COLL VENOUS BLD VENIPUNCTURE: CPT

## 2021-04-15 PROCEDURE — 99233 SBSQ HOSP IP/OBS HIGH 50: CPT | Performed by: INTERNAL MEDICINE

## 2021-04-15 RX ORDER — FUROSEMIDE 10 MG/ML
40 INJECTION INTRAMUSCULAR; INTRAVENOUS ONCE
Status: COMPLETED | OUTPATIENT
Start: 2021-04-15 | End: 2021-04-15

## 2021-04-15 RX ORDER — AMIODARONE HYDROCHLORIDE 200 MG/1
400 TABLET ORAL 2 TIMES DAILY
Status: DISCONTINUED | OUTPATIENT
Start: 2021-04-15 | End: 2021-04-16

## 2021-04-15 RX ADMIN — METOPROLOL SUCCINATE 50 MG: 50 TABLET, EXTENDED RELEASE ORAL at 08:00

## 2021-04-15 RX ADMIN — APIXABAN 5 MG: 5 TABLET, FILM COATED ORAL at 07:55

## 2021-04-15 RX ADMIN — Medication 10 ML: at 07:55

## 2021-04-15 RX ADMIN — FUROSEMIDE 40 MG: 10 INJECTION, SOLUTION INTRAMUSCULAR; INTRAVENOUS at 07:55

## 2021-04-15 RX ADMIN — FUROSEMIDE 40 MG: 10 INJECTION, SOLUTION INTRAMUSCULAR; INTRAVENOUS at 18:07

## 2021-04-15 RX ADMIN — SPIRONOLACTONE 25 MG: 25 TABLET ORAL at 07:55

## 2021-04-15 RX ADMIN — AMIODARONE HYDROCHLORIDE 400 MG: 200 TABLET ORAL at 18:06

## 2021-04-15 RX ADMIN — LISINOPRIL 20 MG: 20 TABLET ORAL at 07:57

## 2021-04-15 RX ADMIN — APIXABAN 5 MG: 5 TABLET, FILM COATED ORAL at 21:20

## 2021-04-15 RX ADMIN — METOPROLOL SUCCINATE 50 MG: 50 TABLET, EXTENDED RELEASE ORAL at 21:21

## 2021-04-15 RX ADMIN — Medication 10 ML: at 21:30

## 2021-04-15 ASSESSMENT — PAIN SCALES - GENERAL
PAINLEVEL_OUTOF10: 0
PAINLEVEL_OUTOF10: 0

## 2021-04-15 NOTE — PROGRESS NOTES
Progress Note  Admit Date: 4/12/2021      PCP: Tamar Jane     CC: F/U for A. fib with RVR    Days in hospital:  3    SUBJECTIVE / Interval History:  Patient feels better. Heart rate in sinus at present post cardioversion    > 260  Neg 5 L      Allergies  Patient has no known allergies. Medications    Scheduled Meds:   furosemide  40 mg Intravenous Daily    sodium chloride flush  5-40 mL Intravenous 2 times per day    apixaban  5 mg Oral BID    spironolactone  25 mg Oral Daily    lisinopril  20 mg Oral Daily    metoprolol succinate  50 mg Oral BID     Continuous Infusions:   sodium chloride      dilTIAZem Stopped (04/13/21 1333)       PRN Meds:  sodium chloride flush, sodium chloride, promethazine **OR** ondansetron, polyethylene glycol, acetaminophen **OR** acetaminophen, potassium chloride **OR** potassium alternative oral replacement **OR** potassium chloride, magnesium sulfate    Vitals    /81   Pulse 69   Temp 98.4 °F (36.9 °C) (Oral)   Resp 16   Ht 6' 5\" (1.956 m)   Wt 260 lb 2.3 oz (118 kg)   SpO2 93%   BMI 30.85 kg/m²     Exam:    Gen: No distress. Eyes: PERRL. No sclera icterus. No conjunctival injection. ENT: No discharge. Pharynx clear. External appearance of ears and nose normal.  Neck: Trachea midline. No obvious mass. Resp: No accessory muscle use. No crackles. No wheezes. No rhonchi. No dullness on percussion. CV: Regular rate. Regular rhythm. No murmur or rub. 2+ edema. GI: Non-tender. Non-distended. No hernia. Skin: Warm, dry, normal texture and turgor. No nodule on exposed extremities. Lymph: No cervical LAD. No supraclavicular LAD. M/S: No cyanosis. No clubbing. No joint deformity. Neuro: Moves all four extremities. CN 2-12 tested, no defect noted. Psych: Oriented x 3. No anxiety. Awake. Alert. Intact judgement and insight.     Data    LABS  CBC:   Recent Labs     04/13/21  0559   WBC 6.2   HGB 13.7   HCT 39.7*   MCV 88.4        BMP: Recent Labs     04/13/21  0559 04/14/21  0510 04/15/21  0526    138 142   K 4.1 4.1 4.5    105 105   CO2 27 23 30   BUN 11 17 17   CREATININE 0.8 0.8 0.8   GLUCOSE 133* 114* 107*     POC GLUCOSE:  No results for input(s): POCGLU in the last 72 hours. LIVER PROFILE:   No results for input(s): AST, ALT, LIPASE, AMYLASE, LABALBU, BILIDIR, BILITOT, ALKPHOS in the last 72 hours. PT/INR:   No results for input(s): PROTIME, INR in the last 72 hours. APTT:   No results for input(s): APTT in the last 72 hours. UA:No results for input(s): NITRITE, COLORU, PHUR, LABCAST, WBCUA, RBCUA, MUCUS, TRICHOMONAS, YEAST, BACTERIA, CLARITYU, SPECGRAV, LEUKOCYTESUR, UROBILINOGEN, BILIRUBINUR, BLOODU, GLUCOSEU, KETUA, AMORPHOUS in the last 72 hours. Microbiology:  Wound Culture: No results for input(s): WNDABS, ORG in the last 72 hours. Invalid input(s):  LABGRAM  Nasal Culture: No results for input(s): ORG, MRSAPCR in the last 72 hours. Blood Culture:   No results for input(s): BC, BLOODCULT2 in the last 72 hours. Fungal Culture:   No results for input(s): FUNGSM in the last 72 hours. No results for input(s): FUNCXBLD in the last 72 hours. CSF Culture:  No results for input(s): COLORCSF, APPEARCSF, CFTUBE, CLOTCSF, WBCCSF, RBCCSF, NEUTCSF, NUMCELLSCSF, LYMPHSCSF, MONOCSF, GLUCCSF, VOLCSF in the last 72 hours. Respiratory Culture:  No results for input(s): [place] in the last 72 hours. AFB:No results for input(s): AFBSMEAR in the last 72 hours. Urine Culture  No results for input(s): LABURIN in the last 72 hours. RADIOLOGY:    XR CHEST (2 VW)   Final Result   Bibasilar airspace disease most consistent with pneumonia.              CONSULTS:    IP CONSULT TO CARDIOLOGY  IP CONSULT TO SPIRITUAL SERVICES  IP CONSULT TO HEART FAILURE NURSE/COORDINATOR    ASSESSMENT AND PLAN:      Active Problems:    Atrial fibrillation with RVR (HCC)    Acute systolic heart failure (HCC)  Resolved Problems:    * No resolved hospital problems. *    Patient is a 67-year-old male with no past medical history who presented with worsening shortness of breath 3 to 4 days prior to admission. He was admitted with A. fib with RVR and CHF exacerbation. Patient was started on a Cardizem drip. A. fib with RVR-now in sinus post cardioversion  - on metoprolol  -Eliquis started      Acute systolic and diastolic heart failure-continue diuresis, still fluid overloaded  -Echo shows an EF of 02% with diastolic dysfunction  -Continue diuresis with IV Lasix  -On lisinopril and Aldactone  -Baseline weight around 255 pounds      DVT Prophylaxis:Eliquis  Diet: DIET CARDIAC; Low Sodium (2 GM); Daily Fluid Restriction: 1500 ml  Code Status: Full Code        Discharge plan -continue care    The patient and / or the family were informed of the results of any tests, a time was given to answer questions, a plan was proposed and they agreed with plan. Discussed with consulting physicians, nursing and social work     The note was completed using EMR. Every effort was made to ensure accuracy; however, inadvertent computerized transcription errors may be present.        Gurdeep Shaw MD

## 2021-04-15 NOTE — CARE COORDINATION
CM follow up. Patient will return home at discharge. Denies wanting home care. MD still diuresing. CM to follow for dc needs.   Electronically signed by Guerrero De La Fuente RN Case Management [phone] on 4/15/2021 at 12:00 PM

## 2021-04-15 NOTE — PROGRESS NOTES
3. 4L    WEIGHT:Admit Weight: 272 lb 11.3 oz (123.7 kg)         Today  Weight: 260 lb 2.3 oz (118 kg)   DRY WEIGHT: 254 lbs  Wt Readings from Last 3 Encounters:   04/15/21 260 lb 2.3 oz (118 kg)       Physical Exam:  GEN: Appears well, no acute distress  SKIN: Pink, warm, dry. Nails without clubbing. HEENT: PERRLA. Normocephalic, atraumatic. Neck supple. No adenopathy. LUNG: AP diameter normal. Clear bilateral. No wheeze, rales, or ronchi. Respiratory effort normal.  HEART: S1S2 A/R. No JVD. No carotid bruit. No murmur, rub or gallop. ABD: Soft, nontender. +BS X 4 quads. No hepatomegaly. EXT: Radial and pedal pulses 2+ and symmetric. Without varicosities. 1+ pedal /ankle edema. MUSCSKEL: Good ROM X4 extremities. No deformity. NEURO: A/O X3. Calm and cooperative. Telemetry: NSR HR 60s    Medications:    furosemide  40 mg Intravenous Daily    sodium chloride flush  5-40 mL Intravenous 2 times per day    apixaban  5 mg Oral BID    spironolactone  25 mg Oral Daily    lisinopril  20 mg Oral Daily    metoprolol succinate  50 mg Oral BID      sodium chloride       sodium chloride flush, sodium chloride, promethazine **OR** ondansetron, polyethylene glycol, acetaminophen **OR** acetaminophen, potassium chloride **OR** potassium alternative oral replacement **OR** potassium chloride, magnesium sulfate    Lab Data: I have reviewed all labs below today.    CBC:   Recent Labs     04/13/21  0559   WBC 6.2   HGB 13.7   HCT 39.7*   MCV 88.4        BMP:   Recent Labs     04/13/21  0559 04/14/21  0510 04/15/21  0526    138 142   K 4.1 4.1 4.5    105 105   CO2 27 23 30   BUN 11 17 17   CREATININE 0.8 0.8 0.8     GLUCOSE:   Recent Labs     04/13/21  0559 04/14/21  0510 04/15/21  0526   GLUCOSE 133* 114* 107*     LIVER PROFILE:   Lab Results   Component Value Date    AST 33 04/12/2021    ALT 49 04/12/2021    LABALBU 3.4 04/12/2021    BILITOT 0.8 04/12/2021    ALKPHOS 104 04/12/2021     PT/INR:   Lab Results   Component Value Date    PROTIME 12.8 04/12/2021    INR 1.10 04/12/2021     APTT:   Lab Results   Component Value Date    APTT 32.2 04/12/2021     Pro-BNP:    Lab Results   Component Value Date    PROBNP 4,011 04/12/2021     Parameters:   > 450 pg/mL under age 48  > 900 pg/mL ages 54-65  > 1800 pg/mL over age 76    ENZYMES:  Lab Results   Component Value Date    TROPONINI <0.01 04/12/2021     FASTING LIPID PANEL:  Lab Results   Component Value Date    CHOL 146 04/14/2021    HDL 35 04/14/2021    [address] 93 04/14/2021    TRIG 90 04/14/2021       Diagnostics:    EKG:   Atrial fibrillation with rapid ventricular response with premature ventricular or aberrantly conducted complexesNonspecific ST and T wave abnormalityAbnormal ECGNo previous ECGs available    ECHO: 4/12/2021   Suboptimal image quality.   Normal left ventricular size and wall thickness. LVEF is difficult to assess  due to arrhythmia but appears mildly reduced with global systolic  dysfunction.    LVEF 45-50%.  Unable to exclude regional wall motion abnormalities.   Indeterminate diastolic function due to atrial fibrillation.   The right ventricle is normal in size and function.   Mild mitral regurgitation is present. 4/14/2021  Onofre/Cardiverson Afib>NSR    CXR 4/12/2021:  Impression   Bibasilar airspace disease most consistent with pneumonia. Assessment/Plan:    1. Acute systolic heart failure  Probably precipitated by atrial fibrillation  Has diuresed well with IV Lasix  Still has some edema  Able to lay flat  We will continue IV Lasix  Renal functions holding  Is on guideline directed medical therapy    2.  New onset AF with RVR:   s/p ONOFRE cardioversion   Remains in sinus rhythm  CHADS2 score is 3 (age, CHF, hypertension)  On Eliquis  Given history of LV dysfunction and CHF, recurrence of A. fib is hazardous and therefore we will add amiodarone loading dose          Electronically signed by Sunita Quiros MD on 4/15/2021 at 3:10 PM

## 2021-04-16 VITALS
WEIGHT: 257.28 LBS | OXYGEN SATURATION: 93 % | RESPIRATION RATE: 18 BRPM | HEART RATE: 62 BPM | TEMPERATURE: 97.8 F | BODY MASS INDEX: 30.38 KG/M2 | HEIGHT: 77 IN | SYSTOLIC BLOOD PRESSURE: 120 MMHG | DIASTOLIC BLOOD PRESSURE: 74 MMHG

## 2021-04-16 LAB
ANION GAP SERPL CALCULATED.3IONS-SCNC: 8 MMOL/L (ref 3–16)
BLOOD CULTURE, ROUTINE: NORMAL
BUN BLDV-MCNC: 15 MG/DL (ref 7–20)
CALCIUM SERPL-MCNC: 8.6 MG/DL (ref 8.3–10.6)
CHLORIDE BLD-SCNC: 103 MMOL/L (ref 99–110)
CO2: 30 MMOL/L (ref 21–32)
CREAT SERPL-MCNC: 0.9 MG/DL (ref 0.8–1.3)
GFR AFRICAN AMERICAN: >60
GFR NON-AFRICAN AMERICAN: >60
GLUCOSE BLD-MCNC: 106 MG/DL (ref 70–99)
POTASSIUM REFLEX MAGNESIUM: 4 MMOL/L (ref 3.5–5.1)
SODIUM BLD-SCNC: 141 MMOL/L (ref 136–145)

## 2021-04-16 PROCEDURE — 6360000002 HC RX W HCPCS: Performed by: INTERNAL MEDICINE

## 2021-04-16 PROCEDURE — 2580000003 HC RX 258: Performed by: NURSE PRACTITIONER

## 2021-04-16 PROCEDURE — 94760 N-INVAS EAR/PLS OXIMETRY 1: CPT

## 2021-04-16 PROCEDURE — 99233 SBSQ HOSP IP/OBS HIGH 50: CPT | Performed by: INTERNAL MEDICINE

## 2021-04-16 PROCEDURE — 6370000000 HC RX 637 (ALT 250 FOR IP): Performed by: INTERNAL MEDICINE

## 2021-04-16 PROCEDURE — 80048 BASIC METABOLIC PNL TOTAL CA: CPT

## 2021-04-16 PROCEDURE — 36415 COLL VENOUS BLD VENIPUNCTURE: CPT

## 2021-04-16 RX ORDER — CHLORTHALIDONE 25 MG/1
25 TABLET ORAL DAILY
Qty: 30 TABLET | Refills: 3 | Status: SHIPPED | OUTPATIENT
Start: 2021-04-16 | End: 2021-05-06 | Stop reason: SDUPTHER

## 2021-04-16 RX ORDER — AMIODARONE HYDROCHLORIDE 200 MG/1
200 TABLET ORAL 2 TIMES DAILY
Qty: 60 TABLET | Refills: 0 | Status: SHIPPED | OUTPATIENT
Start: 2021-04-16 | End: 2021-05-06 | Stop reason: SDUPTHER

## 2021-04-16 RX ORDER — TORSEMIDE 20 MG/1
20 TABLET ORAL DAILY
Qty: 30 TABLET | Refills: 0 | Status: SHIPPED | OUTPATIENT
Start: 2021-04-16 | End: 2021-04-22 | Stop reason: DRUGHIGH

## 2021-04-16 RX ORDER — ATENOLOL 50 MG/1
50 TABLET ORAL DAILY
Status: DISCONTINUED | OUTPATIENT
Start: 2021-04-16 | End: 2021-04-16 | Stop reason: HOSPADM

## 2021-04-16 RX ORDER — ATENOLOL AND CHLORTHALIDONE TABLET 50; 25 MG/1; MG/1
1 TABLET ORAL DAILY
Qty: 30 TABLET | Refills: 3 | Status: SHIPPED | OUTPATIENT
Start: 2021-04-16 | End: 2021-04-16 | Stop reason: HOSPADM

## 2021-04-16 RX ORDER — ATENOLOL 50 MG/1
50 TABLET ORAL DAILY
Qty: 30 TABLET | Refills: 3 | Status: SHIPPED | OUTPATIENT
Start: 2021-04-16 | End: 2021-05-06 | Stop reason: SDUPTHER

## 2021-04-16 RX ORDER — TORSEMIDE 20 MG/1
20 TABLET ORAL DAILY
Qty: 30 TABLET | Refills: 5 | OUTPATIENT
Start: 2021-04-16

## 2021-04-16 RX ORDER — LISINOPRIL 20 MG/1
20 TABLET ORAL 2 TIMES DAILY
Qty: 60 TABLET | Refills: 5 | OUTPATIENT
Start: 2021-04-16

## 2021-04-16 RX ORDER — ATENOLOL AND CHLORTHALIDONE TABLET 50; 25 MG/1; MG/1
1 TABLET ORAL DAILY
Qty: 30 TABLET | Refills: 11 | OUTPATIENT
Start: 2021-04-16 | End: 2022-04-11

## 2021-04-16 RX ORDER — AMIODARONE HYDROCHLORIDE 200 MG/1
200 TABLET ORAL 2 TIMES DAILY
Qty: 60 TABLET | Refills: 5 | OUTPATIENT
Start: 2021-04-16

## 2021-04-16 RX ORDER — SPIRONOLACTONE 25 MG/1
25 TABLET ORAL DAILY
Qty: 30 TABLET | Refills: 3 | Status: SHIPPED | OUTPATIENT
Start: 2021-04-17 | End: 2021-05-06 | Stop reason: SDUPTHER

## 2021-04-16 RX ORDER — LISINOPRIL 20 MG/1
20 TABLET ORAL DAILY
Qty: 30 TABLET | Refills: 3 | Status: SHIPPED | OUTPATIENT
Start: 2021-04-17 | End: 2021-05-06 | Stop reason: SDUPTHER

## 2021-04-16 RX ORDER — SPIRONOLACTONE 25 MG/1
25 TABLET ORAL DAILY
Qty: 30 TABLET | Refills: 3 | OUTPATIENT
Start: 2021-04-16

## 2021-04-16 RX ORDER — CHLORTHALIDONE 25 MG/1
25 TABLET ORAL DAILY
Status: DISCONTINUED | OUTPATIENT
Start: 2021-04-16 | End: 2021-04-16 | Stop reason: HOSPADM

## 2021-04-16 RX ORDER — AMIODARONE HYDROCHLORIDE 200 MG/1
200 TABLET ORAL 2 TIMES DAILY
Status: DISCONTINUED | OUTPATIENT
Start: 2021-04-16 | End: 2021-04-16 | Stop reason: HOSPADM

## 2021-04-16 RX ADMIN — APIXABAN 5 MG: 5 TABLET, FILM COATED ORAL at 08:31

## 2021-04-16 RX ADMIN — CHLORTHALIDONE 25 MG: 25 TABLET ORAL at 09:55

## 2021-04-16 RX ADMIN — SPIRONOLACTONE 25 MG: 25 TABLET ORAL at 08:31

## 2021-04-16 RX ADMIN — Medication 10 ML: at 08:31

## 2021-04-16 RX ADMIN — AMIODARONE HYDROCHLORIDE 200 MG: 200 TABLET ORAL at 08:31

## 2021-04-16 RX ADMIN — FUROSEMIDE 40 MG: 10 INJECTION, SOLUTION INTRAMUSCULAR; INTRAVENOUS at 08:31

## 2021-04-16 RX ADMIN — LISINOPRIL 20 MG: 20 TABLET ORAL at 08:31

## 2021-04-16 RX ADMIN — ATENOLOL 50 MG: 50 TABLET ORAL at 09:55

## 2021-04-16 ASSESSMENT — PAIN SCALES - GENERAL: PAINLEVEL_OUTOF10: 0

## 2021-04-16 NOTE — PROGRESS NOTES
Progress Note  Admit Date: 4/12/2021      PCP: Yomaira Bonilla     CC: F/U for A. fib with RVR    Days in hospital:  4    SUBJECTIVE / Interval History:  Patient feels better. No more shortness of breath  Heart rate in sinus at present post cardioversion    > 257  Neg 7.5 L      Allergies  Patient has no known allergies. Medications    Scheduled Meds:   amiodarone  200 mg Oral BID    atenolol  50 mg Oral Daily    chlorthalidone  25 mg Oral Daily    furosemide  40 mg Intravenous Daily    sodium chloride flush  5-40 mL Intravenous 2 times per day    apixaban  5 mg Oral BID    spironolactone  25 mg Oral Daily    lisinopril  20 mg Oral Daily     Continuous Infusions:   sodium chloride         PRN Meds:  sodium chloride flush, sodium chloride, promethazine **OR** ondansetron, polyethylene glycol, acetaminophen **OR** acetaminophen, potassium chloride **OR** potassium alternative oral replacement **OR** potassium chloride, magnesium sulfate    Vitals    BP (!) 155/100   Pulse 75   Temp 98 °F (36.7 °C) (Oral)   Resp 18   Ht 6' 5\" (1.956 m)   Wt 257 lb 4.4 oz (116.7 kg)   SpO2 94%   BMI 30.51 kg/m²     Exam:    Gen: No distress. Eyes: PERRL. No sclera icterus. No conjunctival injection. ENT: No discharge. Pharynx clear. External appearance of ears and nose normal.  Neck: Trachea midline. No obvious mass. Resp: No accessory muscle use. No crackles. No wheezes. No rhonchi. No dullness on percussion. CV: Regular rate. Regular rhythm. No murmur or rub. 2+ edema. GI: Non-tender. Non-distended. No hernia. Skin: Warm, dry, normal texture and turgor. No nodule on exposed extremities. Lymph: No cervical LAD. No supraclavicular LAD. M/S: No cyanosis. No clubbing. No joint deformity. Neuro: Moves all four extremities. CN 2-12 tested, no defect noted. Psych: Oriented x 3. No anxiety. Awake. Alert. Intact judgement and insight.     Data    LABS  CBC:   No results for input(s): WBC, HGB, HCT, MCV, PLT in the last 72 hours. BMP:   Recent Labs     04/14/21  0510 04/15/21  0526 04/16/21  0505    142 141   K 4.1 4.5 4.0    105 103   CO2 23 30 30   BUN 17 17 15   CREATININE 0.8 0.8 0.9   GLUCOSE 114* 107* 106*     POC GLUCOSE:  No results for input(s): POCGLU in the last 72 hours. LIVER PROFILE:   No results for input(s): AST, ALT, LIPASE, AMYLASE, LABALBU, BILIDIR, BILITOT, ALKPHOS in the last 72 hours. PT/INR:   No results for input(s): PROTIME, INR in the last 72 hours. APTT:   No results for input(s): APTT in the last 72 hours. UA:No results for input(s): NITRITE, COLORU, PHUR, LABCAST, WBCUA, RBCUA, MUCUS, TRICHOMONAS, YEAST, BACTERIA, CLARITYU, SPECGRAV, LEUKOCYTESUR, UROBILINOGEN, BILIRUBINUR, BLOODU, GLUCOSEU, KETUA, AMORPHOUS in the last 72 hours. Microbiology:  Wound Culture: No results for input(s): WNDABS, ORG in the last 72 hours. Invalid input(s):  LABGRAM  Nasal Culture: No results for input(s): ORG, MRSAPCR in the last 72 hours. Blood Culture:   No results for input(s): BC, BLOODCULT2 in the last 72 hours. Fungal Culture:   No results for input(s): FUNGSM in the last 72 hours. No results for input(s): FUNCXBLD in the last 72 hours. CSF Culture:  No results for input(s): COLORCSF, APPEARCSF, CFTUBE, CLOTCSF, WBCCSF, RBCCSF, NEUTCSF, NUMCELLSCSF, LYMPHSCSF, MONOCSF, GLUCCSF, VOLCSF in the last 72 hours. Respiratory Culture:  No results for input(s): Thomas Men in the last 72 hours. AFB:No results for input(s): AFBSMEAR in the last 72 hours. Urine Culture  No results for input(s): LABURIN in the last 72 hours. RADIOLOGY:    XR CHEST (2 VW)   Final Result   Bibasilar airspace disease most consistent with pneumonia.              CONSULTS:    IP CONSULT TO CARDIOLOGY  IP CONSULT TO SPIRITUAL SERVICES  IP CONSULT TO HEART FAILURE NURSE/COORDINATOR    ASSESSMENT AND PLAN:      Active Problems:    Atrial fibrillation with RVR (HCC)    Acute systolic heart failure [facility])  Resolved Problems:    * No resolved hospital problems. *    Patient is a 64-year-old male with no past medical history who presented with worsening shortness of breath 3 to 4 days prior to admission. He was admitted with ATrinidad cerrato with RVR and CHF exacerbation. Patient was started on a Cardizem drip. Patient was weaned off the Cardizem drip. He was cardioverted after which he switched to sinus rhythm. Patient was found to have a EF of 41% with diastolic dysfunction and was diuresed. At the time of discharge patient's weight is about 257 pounds. He will need to continue lisinopril Aldactone at home. Medications to be optimized as an outpatient by cardiology    ATrinidad cerrato with RVR-now in sinus post cardioversion  - on metoprolol  -Eliquis started  -Amiodarone started per cardiology, they will taper it off as an outpatient      Acute systolic and diastolic heart failure-compensated  -Echo shows an EF of 73% with diastolic dysfunction  -Continue diuresis with IV Lasix, switch to Demadex and DC  -On lisinopril and Aldactone  -Chlorthalidone/atenolol added per cardiology  -Baseline weight around 255 pounds      DVT Prophylaxis:Eliquis  Diet: DIET CARDIAC; Low Sodium (2 GM); Daily Fluid Restriction: 1500 ml  Code Status: Full Code        Discharge plan -today  The patient and / or the family were informed of the results of any tests, a time was given to answer questions, a plan was proposed and they agreed with plan. Discussed with consulting physicians, nursing and social work     The note was completed using EMR. Every effort was made to ensure accuracy; however, inadvertent computerized transcription errors may be present.        Selena Wang MD

## 2021-04-16 NOTE — PROGRESS NOTES
[facility]   Daily Progress Note      Admit Date:  4/12/2021    CC: SOB    HPI:   Montrell Lamas is a 72 y.o. male with PMH - no known medical hx    Presented to [facility] with progressively worsening SOB for several days. Associated with orthopnea and exertion. EKG indicated AF RVR . CXR showed white density - pulm edema vs Pna? BNP elevated 4011. ECHO with mod low EF 45-50%. He was started on cardizem gtt and lasix. Underwent JAMIA cardioversion today with Dr. Aleyda Duran. Now in NSR HR 60s. He is a little drowsy after the procedure but arouses and is A/O X3. Denies SOB, CP, LH, dizzy, syncope, palpitations. He does continue with pedal/ankle edema. Hx snoring and daytime fatigue. 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 Interval history  Good diuresis on IV Lasix; over 7.5 L out  Cardioverted yesterday      Review of Systems:   General: Denies fever, chills, +fatigue  Skin: Denies skin changes, rash, itching, lesions.   HEENT: Denies headache, dizziness, vision changes, nosebleeds, sore throat, nasal drainage  RESP: Denies cough, sputum, dyspnea, wheeze, snoring  CARD: Denies palpitations,  Murmur, chest pain  GI:Denies nausea, vomiting, heartburn, loss of appetite, change in bowels  : Denies frequency, pain, incontinence, polyuria  VASC: Denies claudication, leg cramps, clots  MUSC/SKEL: Denies pain, stiffness, arthritis  PSYCH: Denies anxiety, depression, stress  NEURO: Denies numbness, tingling, weakness,change in mood or memory  HEME: Denies abn bruising, bleeding, anemia  ENDO: Denies intolerance to heat, cold, excessive thirst or hunger, hx thyroid disease    Objective:   BP (!) 155/85   Pulse 66   Temp 97.6 °F (36.4 °C) (Oral)   Resp 16   Ht 6' 5\" (1.956 m)   Wt 257 lb 4.4 oz (116.7 kg)   SpO2 91%   BMI 30.51 kg/m²           Intake/Output Summary (Last 24 hours) at 4/16/2021 0826  Last data filed at 4/16/2021 0559  Gross per 24 hour   Intake 1120 ml   Output 3675 ml   Net -2555 ml     I/O since adm: Neg 3.4L    WEIGHT:Admit Weight: 272 lb 11.3 oz (123.7 kg)         Today  Weight: 257 lb 4.4 oz (116.7 kg)   DRY WEIGHT: 254 lbs  Wt Readings from Last 3 Encounters:   04/16/21 257 lb 4.4 oz (116.7 kg)       Physical Exam:  GEN: Appears well, no acute distress  SKIN: Pink, warm, dry. Nails without clubbing. HEENT: PERRLA. Normocephalic, atraumatic. Neck supple. No adenopathy. LUNG: AP diameter normal. Clear bilateral. No wheeze, rales, or ronchi. Respiratory effort normal.  HEART: S1S2 A/R. No JVD. No carotid bruit. No murmur, rub or gallop. ABD: Soft, nontender. +BS X 4 quads. No hepatomegaly. EXT: Radial and pedal pulses 2+ and symmetric. Without varicosities. 1+ pedal /ankle edema. MUSCSKEL: Good ROM X4 extremities. No deformity. NEURO: A/O X3. Calm and cooperative. Telemetry: NSR HR 60s    Medications:    amiodarone  200 mg Oral BID    atenolol  50 mg Oral Daily    chlorthalidone  25 mg Oral Daily    furosemide  40 mg Intravenous Daily    sodium chloride flush  5-40 mL Intravenous 2 times per day    apixaban  5 mg Oral BID    spironolactone  25 mg Oral Daily    lisinopril  20 mg Oral Daily      sodium chloride       sodium chloride flush, sodium chloride, promethazine **OR** ondansetron, polyethylene glycol, acetaminophen **OR** acetaminophen, potassium chloride **OR** potassium alternative oral replacement **OR** potassium chloride, magnesium sulfate    Lab Data: I have reviewed all labs below today. CBC:   No results for input(s): WBC, HGB, HCT, MCV, PLT in the last 72 hours.   BMP:   Recent Labs     04/14/21  0510 04/15/21  0526 04/16/21  0505    142 141   K 4.1 4.5 4.0    105 103   CO2 23 30 30   BUN 17 17 15   CREATININE 0.8 0.8 0.9     GLUCOSE:   Recent Labs     04/14/21  0510 04/15/21  0526 04/16/21  0505   GLUCOSE 114* 107* 106*     LIVER PROFILE:   Lab Results   Component Value Date    AST 33 04/12/2021    ALT 49 04/12/2021    LABALBU 3.4 04/12/2021 BILITOT 0.8 04/12/2021    ALKPHOS 104 04/12/2021     PT/INR:   Lab Results   Component Value Date    PROTIME 12.8 04/12/2021    INR 1.10 04/12/2021     APTT:   Lab Results   Component Value Date    APTT 32.2 04/12/2021     Pro-BNP:    Lab Results   Component Value Date    PROBNP 4,011 04/12/2021     Parameters:   > 450 pg/mL under age 48  > 900 pg/mL ages 54-65  > 1800 pg/mL over age 76    ENZYMES:  Lab Results   Component Value Date    TROPONINI <0.01 04/12/2021     FASTING LIPID PANEL:  Lab Results   Component Value Date    CHOL 146 04/14/2021    HDL 35 04/14/2021    [address] 93 04/14/2021    TRIG 90 04/14/2021       Diagnostics:    EKG:   Atrial fibrillation with rapid ventricular response with premature ventricular or aberrantly conducted complexesNonspecific ST and T wave abnormalityAbnormal ECGNo previous ECGs available    ECHO: 4/12/2021   Suboptimal image quality.   Normal left ventricular size and wall thickness. LVEF is difficult to assess  due to arrhythmia but appears mildly reduced with global systolic  dysfunction.    LVEF 45-50%.  Unable to exclude regional wall motion abnormalities.   Indeterminate diastolic function due to atrial fibrillation.   The right ventricle is normal in size and function.   Mild mitral regurgitation is present. 4/14/2021  Onofre/Cardiverson Afib>NSR    CXR 4/12/2021:  Impression   Bibasilar airspace disease most consistent with pneumonia.        Assessment/Plan:    Acute systolic heart failure  Probably precipitated by atrial fibrillation  Has diuresed well with IV Lasix; over 7.5 L out  Edema in his legs and feet is better  Able to lay flat  Renal functions holding    May DC home  We will treat him with atenolol lisinopril spironolactone and as needed torsemide    New onset AF with RVR:   s/p ONOFRE cardioversion   Remains in sinus rhythm  CHADS2 score is 1 (age, CHF, hypertension)  On Eliquis  Given history of LV dysfunction and CHF, recurrence of A. fib is hazardous and therefore we will add amiodarone loading dose    Hypertension  Blood pressure still uncontrolled  We will increase lisinopril to 20 twice daily  Change metoprolol to atenolol chlorthalidone  He is also on spironolactone and lisinopril 20      Electronically signed by Chelita Sofia MD on 4/16/2021 at 8:26 AM

## 2021-04-16 NOTE — PLAN OF CARE
Problem:  Activity:  Goal: Ability to tolerate increased activity will improve  Description: Ability to tolerate increased activity will improve  4/16/2021 0856 by Tremaine Bhatt RN  Outcome: Ongoing  4/16/2021 0856 by Tremaine Bhatt RN  Outcome: Ongoing  Goal: Expression of feelings of increased energy will increase  Description: Expression of feelings of increased energy will increase  4/16/2021 0856 by Tremaine Bhatt RN  Outcome: Ongoing  4/16/2021 0856 by Tremaine Bhatt RN  Outcome: Ongoing     Problem: Cardiac:  Goal: Ability to maintain an adequate cardiac output will improve  Description: Ability to maintain an adequate cardiac output will improve  4/16/2021 0856 by Tremaine Bhatt RN  Outcome: Ongoing  4/16/2021 0856 by Tremaine Bhatt RN  Outcome: Ongoing  Goal: Complications related to the disease process, condition or treatment will be avoided or minimized  Description: Complications related to the disease process, condition or treatment will be avoided or minimized  4/16/2021 0856 by Tremaine Bhatt RN  Outcome: Ongoing  4/16/2021 0856 by Tremaine Bhatt RN  Outcome: Ongoing     Problem: Coping:  Goal: Level of anxiety will decrease  Description: Level of anxiety will decrease  4/16/2021 0856 by Tremaine Bhatt RN  Outcome: Ongoing  4/16/2021 0856 by Tremaine Bhatt RN  Outcome: Ongoing  Goal: General experience of comfort will improve  Description: General experience of comfort will improve  4/16/2021 0856 by Tremaine Bhatt RN  Outcome: Ongoing  4/16/2021 0856 by Tremaine Bhatt RN  Outcome: Ongoing     Problem: Health Behavior:  Goal: Ability to manage health-related needs will improve  Description: Ability to manage health-related needs will improve  4/16/2021 0856 by Tremaine Bhatt RN  Outcome: Ongoing  4/16/2021 0856 by Tremaine Bhatt RN  Outcome: Ongoing     Problem: Safety:  Goal: Ability to remain free from injury will improve  Description: Ability to remain free from injury will improve  4/16/2021 0856 by Ade Gibson Concha Hernandez RN  Outcome: Ongoing  4/16/2021 0856 by Joe Louie RN  Outcome: Ongoing  Goal: Will show no signs and symptoms of excessive bleeding  Description: Will show no signs and symptoms of excessive bleeding  4/16/2021 0856 by Joe Louie RN  Outcome: Ongoing  4/16/2021 0856 by Joe Louie RN  Outcome: Ongoing     Problem: OXYGENATION/RESPIRATORY FUNCTION  Goal: Patient will maintain patent airway  4/16/2021 0856 by Joe Louie RN  Outcome: Ongoing  4/16/2021 0856 by Joe Louie RN  Outcome: Ongoing  Goal: Patient will achieve/maintain normal respiratory rate/effort  Description: Respiratory rate and effort will be within normal limits for the patient  4/16/2021 0856 by Joe Louie RN  Outcome: Ongoing  4/16/2021 0856 by Joe Louie RN  Outcome: Ongoing     Problem: HEMODYNAMIC STATUS  Goal: Patient has stable vital signs and fluid balance  4/16/2021 0856 by Joe Louie RN  Outcome: Ongoing  4/16/2021 0856 by Joe Louie RN  Outcome: Ongoing     Problem: FLUID AND ELECTROLYTE IMBALANCE  Goal: Fluid and electrolyte balance are achieved/maintained  4/16/2021 0856 by Joe Louie RN  Outcome: Ongoing  4/16/2021 0856 by Joe Louie RN  Outcome: Ongoing     Problem: ACTIVITY INTOLERANCE/IMPAIRED MOBILITY  Goal: Mobility/activity is maintained at optimum level for patient  4/16/2021 0856 by Joe Louie RN  Outcome: Ongoing  4/16/2021 0856 by Joe Louie RN  Outcome: Ongoing     Problem: Falls - Risk of:  Goal: Will remain free from falls  Description: Will remain free from falls  4/16/2021 0856 by Joe Louie RN  Outcome: Ongoing  4/16/2021 0856 by Joe Louie RN  Outcome: Ongoing  Goal: Absence of physical injury  Description: Absence of physical injury  4/16/2021 0856 by Joe Louie RN  Outcome: Ongoing  4/16/2021 0856 by Joe Louie RN  Outcome: Ongoing

## 2021-04-16 NOTE — PROGRESS NOTES
CLINICAL PHARMACY NOTE: [facility] Patient?: No  Total # of Prescriptions Filled: 7   The following medications were delivered to the patient:  Discharge Medication List as of 4/16/2021 12:47 PM      START taking these medications    Details   amiodarone (CORDARONE) 200 MG tablet Take 1 tablet by mouth 2 times daily, Disp-60 tablet, R-0Normal      apixaban (ELIQUIS) 5 MG TABS tablet Take 1 tablet by mouth 2 times daily, Disp-60 tablet, R-1Normal      lisinopril (PRINIVIL;ZESTRIL) 20 MG tablet Take 1 tablet by mouth daily, Disp-30 tablet, R-3Normal      spironolactone (ALDACTONE) 25 MG tablet Take 1 tablet by mouth daily, Disp-30 tablet, R-3Normal      torsemide (DEMADEX) 20 MG tablet Take 1 tablet by mouth daily, Disp-30 tablet, R-0Normal      atenolol (TENORMIN) 50 MG tablet Take 1 tablet by mouth daily, Disp-30 tablet, R-3Normal      chlorthalidone (HYGROTON) 25 MG tablet Take 1 tablet by mouth daily, Disp-30 tablet, R-3Normal         ·   ·   Total # of Interventions Completed: 2  Time Spent (min): 45    Additional Documentation:

## 2021-04-17 NOTE — DISCHARGE SUMMARY
Hospital Medicine Discharge Summary      Patient ID: Jeana Matos      Patient's PCP: Lucas Garrison Date: 4/12/2021     Discharge Date: 4/16/2021  The patient was seen and examined on day of discharge and this discharge summary is in conjunction with any daily progress note from day of discharge. Admitting Physician: David Talbert MD    Discharge Physician: David Talbert MD     Admitted for   Chief Complaint   Patient presents with    Shortness of Breath     sob for approx 2 days       Admitting Diagnosis Atrial fibrillation with RVR [facility]) [I48.91]    Discharge Diagnoses: Active Hospital Problems    Diagnosis Date Noted    Acute systolic heart failure ([address] [I50.21] 04/13/2021    Atrial fibrillation with RVR ([facility] [address] [I48.91] 04/12/2021       Follow Up: Primary Care Physician in one week    PCP to Follow up on   Needs follow-up with cardiology          Hospital Course:     Patient is a 71-year-old male with no past medical history who presented with worsening shortness of breath 3 to 4 days prior to admission. He was admitted with A. fib with RVR and CHF exacerbation. Patient was started on a Cardizem drip. Patient was weaned off the Cardizem drip. He was cardioverted after which he switched to sinus rhythm. Patient was found to have a EF of 36% with diastolic dysfunction and was diuresed. At the time of discharge patient's weight is about 257 pounds. He will need to continue lisinopril Aldactone at home.   Medications to be optimized as an outpatient by cardiology     A. fib with RVR-now in sinus post cardioversion  - on metoprolol  -Eliquis started  -Amiodarone started per cardiology, they will taper it off as an outpatient        Acute systolic and diastolic heart failure-compensated  -Echo shows an EF of 59% with diastolic dysfunction  -Continue diuresis with IV Lasix, switch to Demadex and DC  -On lisinopril and Aldactone  -Chlorthalidone/atenolol added per Jose Miguel Kearney for the opportunity to be involved in this patient's care. If you have any questions or concerns please feel free to contact me at [phone].

## 2021-04-19 ENCOUNTER — FOLLOWUP TELEPHONE ENCOUNTER (OUTPATIENT)
Dept: INPATIENT UNIT | Age: 71
End: 2021-04-19

## 2021-04-19 NOTE — PROGRESS NOTES
Called patient for 72 hour HF hospital follow up. He answered and is agreeable to speak with me re: follow up. He recalls us meeting while he was hospitalized. He shares he is feeling \"pretty good\". He was discharged Friday afternoon and states he went to an early meeting on Saturday and realized \"it was probaby too soon\". So he went home, rested the remainder of the weekend. He shares he has been to work in his office today and then home. He is out now getting his hair cut. Pt states no issues with obtaining his medications and is taking as prescribed. His weight is steady and without issue. Pt reports he is following prescribed diet and can readily speak to sodium / fluid restrictions. Pt is aware of his upcoming appt 4/22 with Dr Jhoan Asher and voices no barriers to attendance. Pt shares \"I was very impressed with the staff. My care was exceptional\". I encouraged pt to call Resource Line with any questions or concerns but reiterated importance of calling [facility] with medications / flare issues. Pt voices understanding and agreement to do so.

## 2021-04-20 NOTE — PROGRESS NOTES
[facility] ([facility]  Cardiology Consult Note      Galina Gandara  1950, 72 y.o.      CC: swelling has gone down                 Henrine Se:      HPI:   This is a 72 y.o. male with no documented PMH who presented to [facility] with progressively worsening shortness of breath for several days associated with orthopnea and exertion. EKG indicated AF RVR . CXR showed white density - pulm edema vs Pna? BNP elevated 4011. ECHO with mod low EF 45-50%. He was started on cardizem gtt and lasix. Underwent JAMIA cardioversion with Dr. Marisa Chacon. Successfully converted to SR. Pedal/ankle edema noted. Good diuresis with IV Lasix. Patient returns in follow up today and has no new cardiac complaints. Has been well and taking all medication as prescribed. Has no edema or SOB. Has been monitoring BP and weight at home. BP readings have been slightly elevated at home. Today, patient denies any chest pain, pressure, tightness, nausea, vomiting, diaphoresis, SOB/GOEL, palpitations, heart racing, dizziness/lightheadedness, orthopnea, PND, LE edema or syncope.     Social History     Tobacco Use    Smoking status: Never Smoker    Smokeless tobacco: Never Used   Substance Use Topics    Alcohol use: Not on file    Drug use: Not on file     No Known Allergies      Review of Systems -   Constitutional: Negative for weight gain/loss; malaise, fever  Respiratory: Negative for Asthma;  cough and hemoptysis  Cardiovascular: Negative for palpitations,dizziness   Gastrointestinal: Negative for abd.pain; constipation/diarrhea;    Genitourinary: Negative for stones; hematuria; frequency hesitancy  Integumentt: Negative for rash or pruritis  Hematologic/lymphatic: Negative for blood dyscrasia; leukemia/lymphoma  Musculoskeletal: Negative for Connective tissue disease  Neurological:  Negative for Seizure   Behavioral/Psych:Negative for Bipolar disorder, Schizophrenia; Dementia  Endocrine: negative for thyroid, parathyroid disease    Physical Examination:    /86   Pulse 57   Ht 6' 5\" (1.956 m)   Wt 255 lb 12.8 oz (116 kg)   SpO2 100%   BMI 30.33 kg/m²    HEENT:  Face: Atraumatic, Conjunctiva: Pink; non icteric,  Mucous Memb:  Moist, No thyromegaly or Lymphadenopathy  Respiratory:  Resp Assessment: normal, Resp Auscultation: clear  Cardiovascular: Auscultation: nl S1 & S2, Palpation:  Nl PMI; No heaves or thrills, JVP:  normal  Abdomen: Soft, non-tender, Normal bowel sounds,  No organomegaly  Extremities: No Cyanosis or Clubbing  Neurological: Oriented to time, place, and person, Non-anxious  Psychiatric: Normal mood and affect  Skin: Warm and dry,  No rash seen     Outpatient Medications Marked as Taking for the 21 encounter (Office Visit) with Contreras Sanders MD   Medication Sig Dispense Refill    [START ON 2021] torsemide (DEMADEX) 20 MG tablet Take 1 tablet by mouth three times a week 12 tablet 5    amiodarone (CORDARONE) 200 MG tablet Take 1 tablet by mouth 2 times daily 60 tablet 0    apixaban (ELIQUIS) 5 MG TABS tablet Take 1 tablet by mouth 2 times daily 60 tablet 1    lisinopril (PRINIVIL;ZESTRIL) 20 MG tablet Take 1 tablet by mouth daily 30 tablet 3    spironolactone (ALDACTONE) 25 MG tablet Take 1 tablet by mouth daily 30 tablet 3    atenolol (TENORMIN) 50 MG tablet Take 1 tablet by mouth daily 30 tablet 3    chlorthalidone (HYGROTON) 25 MG tablet Take 1 tablet by mouth daily 30 tablet 3         Labs:   Lab Results   Component Value Date    HDL 35 2021    [address] 93 2021    TRIG 90 2021         EK21, sinus bradycardia rate 59      Chest X-Ray: 2021  Impression   Bibasilar airspace disease most consistent with pneumonia.      ECHO: 2021  Suboptimal image quality. Normal left ventricular size and wall thickness. LVEF is difficult to assess  due to arrhythmia but appears mildly reduced with global systolic  dysfunction.   LVEF 45-50%.  Unable to exclude regional wall motion abnormalities. Indeterminate diastolic function due to atrial fibrillation. The right ventricle is normal in size and function. Mild mitral regurgitation is present.     DCCV: 4/14/21  Successful external DC cardioversion of atrial fibrillation.      Plan: Will continue with anticoagulation therapy      ASSESSMENT AND PLAN:      Mr. Nina Burris had presented with heart failure and new onset atrial fibrillation. He responded really well to IV diuretics and ultimately underwent successful cardioversion. He is on amiodarone to keep him in sinus rhythm and guideline directed medical therapy for heart failure. On today's visit he appears very well compensated. I am hoping that his LV function will improve with time as well. I am contemplating repeat echo in 6 months      Cardiomyopathy  EF is 45 to 50%  Had presented with biventricular failure precipitated by atrial fibrillation  Compensated on today's exam  Continue GDMT; torsemide, aldactone, atenolol and ACE; reduce torsemide to 20 mg three days weekly with an additional dose daily ad needed for increased SOB, weight gain or edema   NYHA Class II  Repeat labs before f/u visit      AF with RVR:   s/p JAMIA/Cardioversion on 4/14/21  Normal rhythm on today's ECG; on amiodarone and atenolol  CHADS2 score is 3 (age, CHF, hypertension)  On Eliquis for thromboembolic risk reduction   Given history of LV dysfunction and CHF, recurrence of A. fib is hazardous; continue with Amiodarone therapy      Hypertension  BP goal <130/80  Controlled on today's visit  BP readings have been slightly elevated at home   Continue medical management       Follow up in 3 months        Thank you very much for allowing me to participate in the care of your patient. Please do not hesitate to contact me if you have any questions.       Sincerely,    Nasra Chandra M.D  [facility], [address] [place] [address]  Ph: [phone]  Fax: [phone]    This note was scribed in the presence of Dr. Sandee Aguilar MD by [facility]  Physician Attestation:  The scribes documentation has been prepared under my direction and personally reviewed by me in its entirety. I confirm that the note above accurately reflects all work, treatment, procedures, and medical decision making performed by me.

## 2021-04-22 ENCOUNTER — OFFICE VISIT (OUTPATIENT)
Dept: CARDIOLOGY CLINIC | Age: 71
End: 2021-04-22
Payer: MEDICARE

## 2021-04-22 VITALS
DIASTOLIC BLOOD PRESSURE: 86 MMHG | HEART RATE: 57 BPM | WEIGHT: 255.8 LBS | OXYGEN SATURATION: 100 % | BODY MASS INDEX: 30.2 KG/M2 | HEIGHT: 77 IN | SYSTOLIC BLOOD PRESSURE: 124 MMHG

## 2021-04-22 DIAGNOSIS — I10 ESSENTIAL HYPERTENSION: ICD-10-CM

## 2021-04-22 DIAGNOSIS — I50.22 CHRONIC SYSTOLIC HEART FAILURE (HCC): Primary | ICD-10-CM

## 2021-04-22 DIAGNOSIS — I48.91 NEW ONSET A-FIB (HCC): ICD-10-CM

## 2021-04-22 PROCEDURE — 1123F ACP DISCUSS/DSCN MKR DOCD: CPT | Performed by: INTERNAL MEDICINE

## 2021-04-22 PROCEDURE — G8417 CALC BMI ABV UP PARAM F/U: HCPCS | Performed by: INTERNAL MEDICINE

## 2021-04-22 PROCEDURE — 1036F TOBACCO NON-USER: CPT | Performed by: INTERNAL MEDICINE

## 2021-04-22 PROCEDURE — G8427 DOCREV CUR MEDS BY ELIG CLIN: HCPCS | Performed by: INTERNAL MEDICINE

## 2021-04-22 PROCEDURE — 1111F DSCHRG MED/CURRENT MED MERGE: CPT | Performed by: INTERNAL MEDICINE

## 2021-04-22 PROCEDURE — 93000 ELECTROCARDIOGRAM COMPLETE: CPT | Performed by: INTERNAL MEDICINE

## 2021-04-22 PROCEDURE — 99214 OFFICE O/P EST MOD 30 MIN: CPT | Performed by: INTERNAL MEDICINE

## 2021-04-22 PROCEDURE — 4040F PNEUMOC VAC/ADMIN/RCVD: CPT | Performed by: INTERNAL MEDICINE

## 2021-04-22 PROCEDURE — 3017F COLORECTAL CA SCREEN DOC REV: CPT | Performed by: INTERNAL MEDICINE

## 2021-04-22 RX ORDER — TORSEMIDE 20 MG/1
20 TABLET ORAL
Qty: 12 TABLET | Refills: 5 | Status: SHIPPED
Start: 2021-04-23 | End: 2021-05-06 | Stop reason: SDUPTHER

## 2021-04-22 NOTE — PATIENT INSTRUCTIONS
Patient Education        Learning About Heart Failure  What is heart failure? Heart failure means that your heart muscle doesn't pump as much blood as your body needs. Failure doesn't mean that your heart has stopped. It means that your heart isn't pumping as well as it should. Because your heart cannot pump well, your body tries to make up for it. To do this:  · Your body holds on to salt and water. This increases the amount of blood in your bloodstream.  · Your heart beats faster. · Your heart might get bigger. Your body has an amazing ability to make up for heart failure. It may do such a good job that you don't know you have a disease. But at some point, your heart and body will no longer be able to keep up. Then fluid starts to build up in your lungs and other parts of your body. This fluid buildup is called congestion. It's why some doctors call the disease congestive heart failure. What can you expect when you have heart failure? Heart failure is a lifelong (chronic) disease. Treatment may be able to slow the disease and help you feel better. But heart failure tends to get worse over time. Despite this, there are many steps you can take to feel better and stay healthy longer. Early on, your symptoms may not be too bad. As heart failure gets worse, symptoms typically get worse, and you may need to limit your activities. Heart failure can also get worse suddenly. If this happens, you need emergency care. Then, after treatment, your symptoms may go back to being stable (which means they stay the same) for a long time. Heart failure can lead to other health problems, such as heart rhythm problems. Over time, your treatment options may change, especially as your symptoms get worse. You may want to think about what kind of care you want at the end of your life. What are the symptoms? Symptoms of heart failure start to happen when your heart can't pump enough blood to the rest of your body.   In the early stages of heart failure, you may:  · Feel tired easily. · Be short of breath when you exert yourself. · Feel like your heart is pounding or racing (palpitations). · Feel weak or dizzy. As heart failure gets worse, fluid starts to build up in your lungs and other parts of your body. This may cause you to:  · Feel short of breath even at rest.  · Have swelling (edema), especially in your legs, ankles, and feet. · Gain weight. This may happen over just a day or two, or more slowly. · Cough or wheeze, especially when you lie down. · Feel bloated or sick to your stomach. How is heart failure treated? Heart failure is treated with medicines and steps you take to make lifestyle changes and check your symptoms. Treatment can slow the disease and help you feel better and live longer. · You'll probably take several medicines. · You'll take steps to care for yourself at home. Kellen Wylie watch for changes in your symptoms. You may need to make lifestyle changes, such as limiting sodium, getting regular exercise, not smoking, and eating healthy foods. · You might attend cardiac rehabilitation (rehab) to get education and support that help you make lifestyle changes and stay as healthy as possible. · You may get a heart device. A pacemaker helps your heart pump blood. An ICD can stop abnormal heart rhythms. · As heart failure gets worse, palliative care can help improve your quality of life. You can do advance care planning to decide what kind of care you want at the end of your life. How can you care for yourself? There are many steps you can take to feel better and live longer. These steps are an important part of treatment. They can help you stay active and enjoy life. Take your medicine the right way. Avoid medicines that can make your symptoms worse. Check your weight and symptoms every day. Know what to do if your symptoms get worse. Limit sodium. This helps keep fluid from building up.  It may help you feel better. Be active. Exercise regularly, but don't exercise too hard. Be heart-healthy. Eat healthy foods, stay at a healthy weight, limit alcohol, and don't smoke. Stay as healthy as possible. Manage other health problems such as diabetes and high blood pressure. Avoid colds and flu, get help for depression and anxiety, and manage stress. If you think you may have a problem with alcohol or drug use, talk to your doctor. Follow-up care is a key part of your treatment and safety. Be sure to make and go to all appointments, and call your doctor if you are having problems. It's also a good idea to know your test results and keep a list of the medicines you take. Where can you learn more? Go to https://[employer]. org and sign in to your [employer] account. Enter O213 in the Audacious box to learn more about \"Learning About Heart Failure. \"     If you do not have an account, please click on the \"Sign Up Now\" link. Current as of: August 31, 2020               Content Version: 12.8  © 9796-0633 Healthwise, Incorporated. Care instructions adapted under license by [facility] [place] ([facility]). If you have questions about a medical condition or this instruction, always ask your healthcare professional. [address] any warranty or liability for your use of this information. 1. Reduce Torsemide to 20 mg three days weekly. You may take an additional dose for increased shortness of breath, swelling or weight gain >2-5 pounds. 2. Monitor fluid and sodium intake. 3. Repeat labs 2 days prior to 3 month follow up.

## 2021-05-06 RX ORDER — ATENOLOL 50 MG/1
50 TABLET ORAL DAILY
Qty: 30 TABLET | Refills: 3 | Status: SHIPPED | OUTPATIENT
Start: 2021-05-06 | End: 2021-09-01

## 2021-05-06 RX ORDER — SPIRONOLACTONE 25 MG/1
25 TABLET ORAL DAILY
Qty: 30 TABLET | Refills: 3 | Status: SHIPPED | OUTPATIENT
Start: 2021-05-06 | End: 2021-09-01

## 2021-05-06 RX ORDER — LISINOPRIL 20 MG/1
20 TABLET ORAL DAILY
Qty: 30 TABLET | Refills: 3 | Status: SHIPPED | OUTPATIENT
Start: 2021-05-06 | End: 2021-07-29 | Stop reason: ALTCHOICE

## 2021-05-06 RX ORDER — AMIODARONE HYDROCHLORIDE 200 MG/1
200 TABLET ORAL 2 TIMES DAILY
Qty: 60 TABLET | Refills: 0 | Status: SHIPPED | OUTPATIENT
Start: 2021-05-06 | End: 2021-06-07

## 2021-05-06 RX ORDER — TORSEMIDE 20 MG/1
20 TABLET ORAL
Qty: 12 TABLET | Refills: 5 | Status: ON HOLD | OUTPATIENT
Start: 2021-05-07 | End: 2021-06-17 | Stop reason: HOSPADM

## 2021-05-06 RX ORDER — CHLORTHALIDONE 25 MG/1
25 TABLET ORAL DAILY
Qty: 30 TABLET | Refills: 3 | Status: SHIPPED | OUTPATIENT
Start: 2021-05-06 | End: 2021-09-01

## 2021-05-06 NOTE — TELEPHONE ENCOUNTER
Last OV: 04/22/2021  Next OV: 07/29/2021  Labs:bmp 04/16/2021 lipid, cbc 04/14/2021  Last EKG (if needed):04/22/2021

## 2021-05-06 NOTE — TELEPHONE ENCOUNTER
Medication Refill    torsemide (DEMADEX) 20 MG tablet  Take 1 tablet by mouth three times a week    amiodarone (CORDARONE) 200 MG tablet  Take 1 tablet by mouth 2 times daily    apixaban (ELIQUIS) 5 MG TABS tablet  Take 1 tablet by mouth 2 times daily    lisinopril (PRINIVIL;ZESTRIL) 20 MG tablet  Take 1 tablet by mouth daily    spironolactone (ALDACTONE) 25 MG tablet  Take 1 tablet by mouth daily    atenolol (TENORMIN) 50 MG tablet  Take 1 tablet by mouth daily    chlorthalidone (HYGROTON) 25 MG tablet  Take 1 tablet by mouth daily    [address] [place] [address], [address], [address] [place] [address]

## 2021-06-07 RX ORDER — AMIODARONE HYDROCHLORIDE 200 MG/1
TABLET ORAL
Qty: 60 TABLET | Refills: 0 | Status: SHIPPED | OUTPATIENT
Start: 2021-06-07 | End: 2021-07-06

## 2021-06-07 NOTE — TELEPHONE ENCOUNTER
Last OV: 04/22/2021  Next OV: 07/29/2021  Most recent Labs: bmp 04/16/2021 lipid 04/14/2021  Last EKG (if needed):04/22/2021

## 2021-06-16 ENCOUNTER — HOSPITAL ENCOUNTER (INPATIENT)
Age: 71
LOS: 1 days | Discharge: HOME | DRG: 683 | End: 2021-06-17
Attending: EMERGENCY MEDICINE | Admitting: STUDENT IN AN ORGANIZED HEALTH CARE EDUCATION/TRAINING PROGRAM
Payer: MEDICARE

## 2021-06-16 ENCOUNTER — APPOINTMENT (OUTPATIENT)
Dept: GENERAL RADIOLOGY | Age: 71
DRG: 683 | End: 2021-06-16
Payer: MEDICARE

## 2021-06-16 DIAGNOSIS — R07.9 CHEST PAIN, UNSPECIFIED TYPE: Primary | ICD-10-CM

## 2021-06-16 DIAGNOSIS — N17.9 AKI (ACUTE KIDNEY INJURY) (HCC): ICD-10-CM

## 2021-06-16 LAB
A/G RATIO: 1.3 (ref 1.1–2.2)
ALBUMIN SERPL-MCNC: 4 G/DL (ref 3.4–5)
ALP BLD-CCNC: 76 U/L (ref 40–129)
ALT SERPL-CCNC: 15 U/L (ref 10–40)
ANION GAP SERPL CALCULATED.3IONS-SCNC: 13 MMOL/L (ref 3–16)
AST SERPL-CCNC: 14 U/L (ref 15–37)
BACTERIA: ABNORMAL /HPF
BASOPHILS ABSOLUTE: 0.1 K/UL (ref 0–0.2)
BASOPHILS RELATIVE PERCENT: 0.7 %
BILIRUB SERPL-MCNC: <0.2 MG/DL (ref 0–1)
BILIRUBIN URINE: NEGATIVE
BLOOD, URINE: NEGATIVE
BUN BLDV-MCNC: 45 MG/DL (ref 7–20)
CALCIUM SERPL-MCNC: 8.7 MG/DL (ref 8.3–10.6)
CHLORIDE BLD-SCNC: 99 MMOL/L (ref 99–110)
CLARITY: CLEAR
CO2: 26 MMOL/L (ref 21–32)
COLOR: YELLOW
CREAT SERPL-MCNC: 1.7 MG/DL (ref 0.8–1.3)
EKG ATRIAL RATE: 58 BPM
EKG DIAGNOSIS: NORMAL
EKG P AXIS: 31 DEGREES
EKG P-R INTERVAL: 144 MS
EKG Q-T INTERVAL: 478 MS
EKG QRS DURATION: 88 MS
EKG QTC CALCULATION (BAZETT): 469 MS
EKG R AXIS: -2 DEGREES
EKG T AXIS: 16 DEGREES
EKG VENTRICULAR RATE: 58 BPM
EOSINOPHILS ABSOLUTE: 0.3 K/UL (ref 0–0.6)
EOSINOPHILS RELATIVE PERCENT: 3.6 %
EPITHELIAL CELLS, UA: ABNORMAL /HPF (ref 0–5)
GFR AFRICAN AMERICAN: 48
GFR NON-AFRICAN AMERICAN: 40
GLOBULIN: 3 G/DL
GLUCOSE BLD-MCNC: 125 MG/DL (ref 70–99)
GLUCOSE URINE: NEGATIVE MG/DL
HCT VFR BLD CALC: 42.3 % (ref 40.5–52.5)
HEMOGLOBIN: 14.6 G/DL (ref 13.5–17.5)
HYALINE CASTS: ABNORMAL /LPF (ref 0–2)
KETONES, URINE: NEGATIVE MG/DL
LEUKOCYTE ESTERASE, URINE: ABNORMAL
LIPASE: 30 U/L (ref 13–60)
LV EF: 65 %
LVEF MODALITY: NORMAL
LYMPHOCYTES ABSOLUTE: 1.9 K/UL (ref 1–5.1)
LYMPHOCYTES RELATIVE PERCENT: 21.6 %
MCH RBC QN AUTO: 30 PG (ref 26–34)
MCHC RBC AUTO-ENTMCNC: 34.5 G/DL (ref 31–36)
MCV RBC AUTO: 86.9 FL (ref 80–100)
MICROSCOPIC EXAMINATION: YES
MONOCYTES ABSOLUTE: 0.8 K/UL (ref 0–1.3)
MONOCYTES RELATIVE PERCENT: 9.1 %
MUCUS: ABNORMAL /LPF
NEUTROPHILS ABSOLUTE: 5.6 K/UL (ref 1.7–7.7)
NEUTROPHILS RELATIVE PERCENT: 65 %
NITRITE, URINE: NEGATIVE
PDW BLD-RTO: 14 % (ref 12.4–15.4)
PH UA: 5.5 (ref 5–8)
PLATELET # BLD: 199 K/UL (ref 135–450)
PMV BLD AUTO: 8.3 FL (ref 5–10.5)
POTASSIUM REFLEX MAGNESIUM: 4.2 MMOL/L (ref 3.5–5.1)
PRO-BNP: 185 PG/ML (ref 0–124)
PROTEIN UA: NEGATIVE MG/DL
RBC # BLD: 4.87 M/UL (ref 4.2–5.9)
RBC UA: ABNORMAL /HPF (ref 0–4)
SODIUM BLD-SCNC: 138 MMOL/L (ref 136–145)
SPECIFIC GRAVITY UA: 1.02 (ref 1–1.03)
TOTAL PROTEIN: 7 G/DL (ref 6.4–8.2)
TROPONIN: <0.01 NG/ML
URINE REFLEX TO CULTURE: YES
URINE TYPE: ABNORMAL
UROBILINOGEN, URINE: 0.2 E.U./DL
WBC # BLD: 8.7 K/UL (ref 4–11)
WBC UA: ABNORMAL /HPF (ref 0–5)

## 2021-06-16 PROCEDURE — A9502 TC99M TETROFOSMIN: HCPCS | Performed by: STUDENT IN AN ORGANIZED HEALTH CARE EDUCATION/TRAINING PROGRAM

## 2021-06-16 PROCEDURE — 6360000002 HC RX W HCPCS: Performed by: STUDENT IN AN ORGANIZED HEALTH CARE EDUCATION/TRAINING PROGRAM

## 2021-06-16 PROCEDURE — 94760 N-INVAS EAR/PLS OXIMETRY 1: CPT

## 2021-06-16 PROCEDURE — 6370000000 HC RX 637 (ALT 250 FOR IP): Performed by: STUDENT IN AN ORGANIZED HEALTH CARE EDUCATION/TRAINING PROGRAM

## 2021-06-16 PROCEDURE — 2580000003 HC RX 258: Performed by: EMERGENCY MEDICINE

## 2021-06-16 PROCEDURE — A9502 TC99M TETROFOSMIN: HCPCS | Performed by: INTERNAL MEDICINE

## 2021-06-16 PROCEDURE — 93017 CV STRESS TEST TRACING ONLY: CPT

## 2021-06-16 PROCEDURE — 80053 COMPREHEN METABOLIC PANEL: CPT

## 2021-06-16 PROCEDURE — 71045 X-RAY EXAM CHEST 1 VIEW: CPT

## 2021-06-16 PROCEDURE — 85025 COMPLETE CBC W/AUTO DIFF WBC: CPT

## 2021-06-16 PROCEDURE — 96374 THER/PROPH/DIAG INJ IV PUSH: CPT

## 2021-06-16 PROCEDURE — 3430000000 HC RX DIAGNOSTIC RADIOPHARMACEUTICAL: Performed by: INTERNAL MEDICINE

## 2021-06-16 PROCEDURE — 99285 EMERGENCY DEPT VISIT HI MDM: CPT

## 2021-06-16 PROCEDURE — 81001 URINALYSIS AUTO W/SCOPE: CPT

## 2021-06-16 PROCEDURE — 83690 ASSAY OF LIPASE: CPT

## 2021-06-16 PROCEDURE — 1200000000 HC SEMI PRIVATE

## 2021-06-16 PROCEDURE — 93005 ELECTROCARDIOGRAM TRACING: CPT | Performed by: EMERGENCY MEDICINE

## 2021-06-16 PROCEDURE — 36415 COLL VENOUS BLD VENIPUNCTURE: CPT

## 2021-06-16 PROCEDURE — 2580000003 HC RX 258: Performed by: STUDENT IN AN ORGANIZED HEALTH CARE EDUCATION/TRAINING PROGRAM

## 2021-06-16 PROCEDURE — 2580000003 HC RX 258: Performed by: INTERNAL MEDICINE

## 2021-06-16 PROCEDURE — 84484 ASSAY OF TROPONIN QUANT: CPT

## 2021-06-16 PROCEDURE — G0378 HOSPITAL OBSERVATION PER HR: HCPCS

## 2021-06-16 PROCEDURE — 87086 URINE CULTURE/COLONY COUNT: CPT

## 2021-06-16 PROCEDURE — 93010 ELECTROCARDIOGRAM REPORT: CPT | Performed by: INTERNAL MEDICINE

## 2021-06-16 PROCEDURE — 83880 ASSAY OF NATRIURETIC PEPTIDE: CPT

## 2021-06-16 PROCEDURE — 3430000000 HC RX DIAGNOSTIC RADIOPHARMACEUTICAL: Performed by: STUDENT IN AN ORGANIZED HEALTH CARE EDUCATION/TRAINING PROGRAM

## 2021-06-16 PROCEDURE — 96375 TX/PRO/DX INJ NEW DRUG ADDON: CPT

## 2021-06-16 PROCEDURE — 78452 HT MUSCLE IMAGE SPECT MULT: CPT

## 2021-06-16 PROCEDURE — 99223 1ST HOSP IP/OBS HIGH 75: CPT | Performed by: INTERNAL MEDICINE

## 2021-06-16 RX ORDER — ACETAMINOPHEN 650 MG/1
650 SUPPOSITORY RECTAL EVERY 6 HOURS PRN
Status: DISCONTINUED | OUTPATIENT
Start: 2021-06-16 | End: 2021-06-17 | Stop reason: HOSPADM

## 2021-06-16 RX ORDER — ONDANSETRON 4 MG/1
4 TABLET, ORALLY DISINTEGRATING ORAL EVERY 8 HOURS PRN
Status: DISCONTINUED | OUTPATIENT
Start: 2021-06-16 | End: 2021-06-17 | Stop reason: HOSPADM

## 2021-06-16 RX ORDER — ASPIRIN 325 MG
325 TABLET ORAL ONCE
Status: DISCONTINUED | OUTPATIENT
Start: 2021-06-16 | End: 2021-06-17 | Stop reason: HOSPADM

## 2021-06-16 RX ORDER — SODIUM CHLORIDE 9 MG/ML
INJECTION, SOLUTION INTRAVENOUS CONTINUOUS
Status: ACTIVE | OUTPATIENT
Start: 2021-06-16 | End: 2021-06-16

## 2021-06-16 RX ORDER — 0.9 % SODIUM CHLORIDE 0.9 %
500 INTRAVENOUS SOLUTION INTRAVENOUS ONCE
Status: COMPLETED | OUTPATIENT
Start: 2021-06-16 | End: 2021-06-16

## 2021-06-16 RX ORDER — SODIUM CHLORIDE 0.9 % (FLUSH) 0.9 %
5-40 SYRINGE (ML) INJECTION EVERY 12 HOURS SCHEDULED
Status: DISCONTINUED | OUTPATIENT
Start: 2021-06-16 | End: 2021-06-17 | Stop reason: HOSPADM

## 2021-06-16 RX ORDER — AMIODARONE HYDROCHLORIDE 200 MG/1
200 TABLET ORAL 2 TIMES DAILY
Status: DISCONTINUED | OUTPATIENT
Start: 2021-06-16 | End: 2021-06-17 | Stop reason: HOSPADM

## 2021-06-16 RX ORDER — ONDANSETRON 2 MG/ML
4 INJECTION INTRAMUSCULAR; INTRAVENOUS EVERY 6 HOURS PRN
Status: DISCONTINUED | OUTPATIENT
Start: 2021-06-16 | End: 2021-06-17 | Stop reason: HOSPADM

## 2021-06-16 RX ORDER — FENTANYL CITRATE 50 UG/ML
25 INJECTION, SOLUTION INTRAMUSCULAR; INTRAVENOUS ONCE
Status: DISCONTINUED | OUTPATIENT
Start: 2021-06-16 | End: 2021-06-16

## 2021-06-16 RX ORDER — MORPHINE SULFATE 4 MG/ML
4 INJECTION, SOLUTION INTRAMUSCULAR; INTRAVENOUS EVERY 4 HOURS PRN
Status: DISCONTINUED | OUTPATIENT
Start: 2021-06-16 | End: 2021-06-17 | Stop reason: HOSPADM

## 2021-06-16 RX ORDER — ASPIRIN 81 MG/1
81 TABLET, CHEWABLE ORAL DAILY
Status: DISCONTINUED | OUTPATIENT
Start: 2021-06-17 | End: 2021-06-17 | Stop reason: HOSPADM

## 2021-06-16 RX ORDER — SODIUM CHLORIDE 9 MG/ML
25 INJECTION, SOLUTION INTRAVENOUS PRN
Status: DISCONTINUED | OUTPATIENT
Start: 2021-06-16 | End: 2021-06-17 | Stop reason: HOSPADM

## 2021-06-16 RX ORDER — ATORVASTATIN CALCIUM 80 MG/1
80 TABLET, FILM COATED ORAL NIGHTLY
Status: DISCONTINUED | OUTPATIENT
Start: 2021-06-16 | End: 2021-06-17 | Stop reason: HOSPADM

## 2021-06-16 RX ORDER — ACETAMINOPHEN 325 MG/1
650 TABLET ORAL EVERY 6 HOURS PRN
Status: DISCONTINUED | OUTPATIENT
Start: 2021-06-16 | End: 2021-06-17 | Stop reason: HOSPADM

## 2021-06-16 RX ORDER — SODIUM CHLORIDE 0.9 % (FLUSH) 0.9 %
5-40 SYRINGE (ML) INJECTION PRN
Status: DISCONTINUED | OUTPATIENT
Start: 2021-06-16 | End: 2021-06-17 | Stop reason: HOSPADM

## 2021-06-16 RX ADMIN — Medication 10 ML: at 08:06

## 2021-06-16 RX ADMIN — ATORVASTATIN CALCIUM 80 MG: 80 TABLET, FILM COATED ORAL at 20:03

## 2021-06-16 RX ADMIN — TETROFOSMIN 10 MILLICURIE: 1.38 INJECTION, POWDER, LYOPHILIZED, FOR SOLUTION INTRAVENOUS at 08:28

## 2021-06-16 RX ADMIN — AMIODARONE HYDROCHLORIDE 200 MG: 200 TABLET ORAL at 08:05

## 2021-06-16 RX ADMIN — SODIUM CHLORIDE: 9 INJECTION, SOLUTION INTRAVENOUS at 13:26

## 2021-06-16 RX ADMIN — MORPHINE SULFATE 4 MG: 4 INJECTION, SOLUTION INTRAMUSCULAR; INTRAVENOUS at 13:26

## 2021-06-16 RX ADMIN — SODIUM CHLORIDE 500 ML: 9 INJECTION, SOLUTION INTRAVENOUS at 04:56

## 2021-06-16 RX ADMIN — MORPHINE SULFATE 4 MG: 4 INJECTION, SOLUTION INTRAMUSCULAR; INTRAVENOUS at 06:44

## 2021-06-16 RX ADMIN — APIXABAN 5 MG: 5 TABLET, FILM COATED ORAL at 08:05

## 2021-06-16 RX ADMIN — APIXABAN 5 MG: 5 TABLET, FILM COATED ORAL at 20:03

## 2021-06-16 RX ADMIN — Medication 10 ML: at 20:03

## 2021-06-16 RX ADMIN — AMIODARONE HYDROCHLORIDE 200 MG: 200 TABLET ORAL at 20:03

## 2021-06-16 RX ADMIN — REGADENOSON 0.4 MG: 0.08 INJECTION, SOLUTION INTRAVENOUS at 10:06

## 2021-06-16 RX ADMIN — TETROFOSMIN 30 MILLICURIE: 1.38 INJECTION, POWDER, LYOPHILIZED, FOR SOLUTION INTRAVENOUS at 10:10

## 2021-06-16 ASSESSMENT — PAIN DESCRIPTION - FREQUENCY
FREQUENCY: CONTINUOUS

## 2021-06-16 ASSESSMENT — PAIN SCALES - GENERAL
PAINLEVEL_OUTOF10: 4
PAINLEVEL_OUTOF10: 2
PAINLEVEL_OUTOF10: 5
PAINLEVEL_OUTOF10: 0
PAINLEVEL_OUTOF10: 4
PAINLEVEL_OUTOF10: 4
PAINLEVEL_OUTOF10: 7
PAINLEVEL_OUTOF10: 4
PAINLEVEL_OUTOF10: 2
PAINLEVEL_OUTOF10: 4
PAINLEVEL_OUTOF10: 7

## 2021-06-16 ASSESSMENT — PAIN DESCRIPTION - DESCRIPTORS
DESCRIPTORS: DISCOMFORT
DESCRIPTORS: DISCOMFORT;SORE;TIGHTNESS
DESCRIPTORS: DISCOMFORT
DESCRIPTORS: DISCOMFORT;SORE
DESCRIPTORS: DISCOMFORT;SORE;TIGHTNESS

## 2021-06-16 ASSESSMENT — PAIN DESCRIPTION - PAIN TYPE
TYPE: ACUTE PAIN

## 2021-06-16 ASSESSMENT — PAIN DESCRIPTION - LOCATION
LOCATION: CHEST

## 2021-06-16 ASSESSMENT — ENCOUNTER SYMPTOMS
COLOR CHANGE: 0
BACK PAIN: 0
WHEEZING: 0
VOMITING: 0
RHINORRHEA: 0
NAUSEA: 0
PHOTOPHOBIA: 0
SHORTNESS OF BREATH: 0

## 2021-06-16 ASSESSMENT — PAIN DESCRIPTION - ONSET
ONSET: SUDDEN
ONSET: ON-GOING
ONSET: ON-GOING
ONSET: SUDDEN
ONSET: ON-GOING
ONSET: ON-GOING

## 2021-06-16 ASSESSMENT — PAIN DESCRIPTION - ORIENTATION
ORIENTATION: MID;UPPER

## 2021-06-16 ASSESSMENT — PAIN DESCRIPTION - PROGRESSION
CLINICAL_PROGRESSION: NOT CHANGED
CLINICAL_PROGRESSION: GRADUALLY WORSENING

## 2021-06-16 ASSESSMENT — HEART SCORE: ECG: 0

## 2021-06-16 NOTE — PROGRESS NOTES
4 Eyes Skin Assessment     NAME:  Yolis Wall  YOB: 1950  MEDICAL RECORD NUMBER:  [id number]    The patient is being assess for  Admission    I agree that 2 RN's have performed a thorough Head to Toe Skin Assessment on the patient. ALL assessment sites listed below have been assessed. Areas assessed by both nurses:    Head, Face, Ears, Shoulders, Back, Chest, Arms, Elbows, Hands, Sacrum. Buttock, Coccyx, Ischium and Legs. Feet and Heels        Does the Patient have a Wound?  No noted wound(s)       Blaine Prevention initiated:  No   Wound Care Orders initiated:  No    Pressure Injury (Stage 3,4, Unstageable, DTI, NWPT, and Complex wounds) if present place consult order under [de-identified] No    New and Established Ostomies if present place consult order under : No      Nurse 1 eSignature: Electronically signed by Margarita Beach RN on 6/16/21 at 6:21 AM EDT    **SHARE this note so that the co-signing nurse is able to place an eSignature**    Nurse 2 eSignature: Electronically signed by Brandon Vasquez RN on 6/16/21 at 7:23 AM EDT

## 2021-06-16 NOTE — PROGRESS NOTES
Pt arrived to room 4250 via stretcher. Pt is alert and oriented, VSS. Pt demonstrated steady gait to bed. Pt educated on fall risks, fall precautions in place. Pt oriented to room, call light within reach. Pt denies questions and further needs at this time.

## 2021-06-16 NOTE — H&P
Hospital Medicine History & Physical      PCP: Yulia Vines    Date of Admission: 6/16/2021    Date of Service: Pt seen/examined on 6/16/21 and Admitted to Inpatient     Chief Complaint: Chest pain    History Of Present Illness: The patient is a 72 y.o. male who presents to [facility] with chest pain. Patient states his chest pain started around midnight last night was located substernally. Denies it feeling like a burning, sharp, stabbing pain. Denies any kind of chest tightness either. He says it felt more like an ache. It was not associated with any nausea vomiting, diaphoresis or shortness of breath. The pain did get worse with movement and was relieved by morphine. Patient came to the emergency department and had troponins drawn which were negative and an EKG which did not show any acute changes. Stress test was ordered and came back negative. His troponins were trended and were also found to be negative. His renal function was double what it was on discharge over a month ago. He admits to drinking less fluids while still taking his chlorthalidone, Aldactone, torsemide. Cardiology was consulted for his chest pain along with his adjustments to at home medications. Past Medical History:    No past medical history on file. Past Surgical History:    No past surgical history on file. Medications Prior to Admission:    Prior to Admission medications    Medication Sig Start Date End Date Taking?  Authorizing Provider   amiodarone (CORDARONE) 200 MG tablet TAKE 1 TABLET BY MOUTH TWICE DAILY 6/7/21  Yes Jose Lopez MD   torsemide (DEMADEX) 20 MG tablet Take 1 tablet by mouth three times a week 5/7/21 6/16/21 Yes Jose Lopez MD   apixaban (ELIQUIS) 5 MG TABS tablet Take 1 tablet by mouth 2 times daily 5/6/21  Yes Jose Lopez MD   lisinopril (PRINIVIL;ZESTRIL) 20 MG tablet Take 1 tablet by mouth daily 5/6/21  Yes Chris Lopes MD   chlorthalidone (HYGROTON) 25 MG tablet Take 1 tablet by mouth daily 5/6/21  Yes Chris Lopes MD   atenolol (TENORMIN) 50 MG tablet Take 1 tablet by mouth daily 5/6/21  Yes Chris Lopes MD   spironolactone (ALDACTONE) 25 MG tablet Take 1 tablet by mouth daily 5/6/21  Yes Chris Lopes MD       Allergies:  Patient has no known allergies. Social History:  The patient currently lives at home    TOBACCO:   reports that he has never smoked. He has never used smokeless tobacco.  ETOH:   has no history on file for alcohol use. Family History:  Reviewed in detail and negative for DM, Early CAD, Cancer, CVA. Positive as follows:    No family history on file. REVIEW OF SYSTEMS:   Positive for as noted in the HPI. All other systems reviewed and negative. PHYSICAL EXAM:    /73   Pulse 80   Temp 97.7 °F (36.5 °C) (Oral)   Resp 16   Ht 6' 5\" (1.956 m)   Wt 256 lb 13.4 oz (116.5 kg)   SpO2 94%   BMI 30.46 kg/m²     General appearance: No apparent distress appears stated age and cooperative. HEENT Normal cephalic, atraumatic without obvious deformity. Pupils equal, round, and reactive to light. Extra ocular muscles intact. Conjunctivae/corneas clear. Neck: Supple, No jugular venous distention/bruits. Trachea midline without thyromegaly or adenopathy with full range of motion. Lungs: Clear to auscultation, bilaterally without Rales/Wheezes/Rhonchi with good respiratory effort. Heart: Regular rate and rhythm with Normal S1/S2 without murmurs, rubs or gallops, point of maximum impulse non-displaced  Abdomen: Soft, non-tender or non-distended without rigidity or guarding and positive bowel sounds all four quadrants. Extremities: No clubbing, cyanosis, or edema bilaterally. Full range of motion without deformity and normal gait intact. Skin: Skin color, texture, turgor normal.  No rashes or lesions.   Neurologic: heart failure  Not currently in exacerbation  Hold home medications in light of TYLER  Discharge without torsemide    DVT Prophylaxis: Lovenox  Diet: ADULT DIET; Regular; 2000 ml  Code Status: Full Code  PT/OT Eval Status: Not applicable    Dispo -inpatient       Jono Paredes MD    Thank you Patrica Toussaint for the opportunity to be involved in this patient's care. If you have any questions or concerns please feel free to contact me at [phone].

## 2021-06-16 NOTE — ED PROVIDER NOTES
[facility]  [facility]      Pt Name: Michelle Edge  MRN: [id number]  [place] 1950  Date ofevaluation: 6/16/2021  Provider: Enid Kohli MD    CHIEF COMPLAINT       Chief Complaint   Patient presents with    Chest Pain         HISTORY OF PRESENT ILLNESS   (Location/Symptom, Timing/Onset,Context/Setting, Quality, Duration, Modifying Factors, Severity)  Note limiting factors. Michelle Edge is a 72 y.o. male  who  has no past medical history on file. who presents to the emergency department for evaluation of chest pain. Patient reports acute onset of substernal chest pain with radiation to the back that occurred shortly prior to arrival.  Patient states that symptoms began spontaneously. He has never had chest pain like this before. He states that his slightly improved since arriving to the emergency department. He denies fevers cough or shortness of breath. Denies diaphoresis or nausea. Denies pain or swelling to lower extremities. Patient was recently admitted to the hospital and treated for A. fib with RVR. He did have an echocardiogram.  Patient is currently on anticoagulation and states he is noncompliant with medications. He reports that the dose of his diuretics was recently changed. Denies abdominal pain or changes in bowel or urine function. Denies a history of previous stress test or catheterizations. He does report a family history of CAD. Patient received nitroglycerin as well as aspirin during transportation. HPI    NursingNotes were reviewed. REVIEW OF SYSTEMS    (2-9 systems for level 4, 10 or more for level 5)     Review of Systems   Constitutional: Negative for activity change, chills and fever. HENT: Negative for congestion and rhinorrhea. Eyes: Negative for photophobia and visual disturbance. Respiratory: Negative for shortness of breath and wheezing. Cardiovascular: Positive for chest pain.  Negative for palpitations and leg swelling. Gastrointestinal: Negative for nausea and vomiting. Endocrine: Negative for polydipsia and polyuria. Genitourinary: Negative for difficulty urinating and frequency. Musculoskeletal: Negative for back pain and gait problem. Skin: Negative for color change and rash. Neurological: Negative for light-headedness and headaches. Psychiatric/Behavioral: Negative for confusion. The patient is not nervous/anxious. All other systems reviewed and are negative. Except as noted above the remainder of the review of systems was reviewed and negative. PAST MEDICAL HISTORY   No past medical history on file. SURGICALHISTORY     No past surgical history on file. CURRENT MEDICATIONS       Previous Medications    AMIODARONE (CORDARONE) 200 MG TABLET    TAKE 1 TABLET BY MOUTH TWICE DAILY    APIXABAN (ELIQUIS) 5 MG TABS TABLET    Take 1 tablet by mouth 2 times daily    ATENOLOL (TENORMIN) 50 MG TABLET    Take 1 tablet by mouth daily    CHLORTHALIDONE (HYGROTON) 25 MG TABLET    Take 1 tablet by mouth daily    LISINOPRIL (PRINIVIL;ZESTRIL) 20 MG TABLET    Take 1 tablet by mouth daily    SPIRONOLACTONE (ALDACTONE) 25 MG TABLET    Take 1 tablet by mouth daily    TORSEMIDE (DEMADEX) 20 MG TABLET    Take 1 tablet by mouth three times a week            Patient has no known allergies. FAMILY HISTORY     No family history on file. SOCIAL HISTORY       Social History     Socioeconomic History    Marital status:       Spouse name: Not on file    Number of children: Not on file    Years of education: Not on file    Highest education level: Not on file   Occupational History    Not on file   Tobacco Use    Smoking status: Never Smoker    Smokeless tobacco: Never Used   Vaping Use    Vaping Use: Never used   Substance and Sexual Activity    Alcohol use: Not on file    Drug use: Not on file    Sexual activity: Not on file   Other Topics Concern    Not on deviation. Cardiovascular:      Rate and Rhythm: Normal rate and regular rhythm. Pulmonary:      Effort: Pulmonary effort is normal.      Breath sounds: Normal breath sounds. No wheezing or rales. Chest:      Chest wall: No tenderness. Abdominal:      General: There is no distension. Palpations: Abdomen is soft. Tenderness: There is no abdominal tenderness. Musculoskeletal:         General: No deformity. Normal range of motion. Cervical back: Normal range of motion. Skin:     General: Skin is warm and dry. Capillary Refill: Capillary refill takes less than 2 seconds. Neurological:      General: No focal deficit present. Mental Status: He is alert and oriented to person, place, and time. Mental status is at baseline. RESULTS     EKG: All EKG's are interpreted by the Emergency Department Physician who either signs or Co-signsthis chart in the absence of a cardiologist.    EKG shows sinus rhythm with a ventricular to 50 bpm.  Patient's MS interval and QTc interval within normal limits. Patient has a normal axis. There are no significant ST elevations or depressions EKG is nondiagnostic for ACS. I am unable to see previous EKGs this time. RADIOLOGY:   Non-plain filmimages such as CT, Ultrasound and MRI are read by the radiologist. Plain radiographic images are visualized and preliminarily interpreted by the emergency physician with the below findings:      Interpretation per the Radiologist below, if available at the time ofthis note:    XR CHEST PORTABLE   Final Result   No acute disease.                ED BEDSIDE ULTRASOUND:   Performed by ED Physician - none    LABS:  Labs Reviewed   COMPREHENSIVE METABOLIC PANEL W/ REFLEX TO MG FOR LOW K - Abnormal; Notable for the following components:       Result Value    Glucose 125 (*)     BUN 45 (*)     CREATININE 1.7 (*)     GFR Non- 40 (*)     GFR  48 (*)     AST 14 (*)     All other components within normal limits    Narrative:     Performed at:  [facility]  [address] [place] [address]   Phone [phone]   BRAIN NATRIURETIC PEPTIDE - Abnormal; Notable for the following components:    Pro- (*)     All other components within normal limits    Narrative:     Performed at:  [facility]  [address] [place] [address]   Phone [phone]   CBC WITH AUTO DIFFERENTIAL    Narrative:     Performed at:  [facility]  [address] [place] [address]   Phone [phone]   LIPASE    Narrative:     Performed at:  [facility]  [address] [place] [address]   Phone [phone]   TROPONIN    Narrative:     Performed at:  [facility]  [address] [place] [address]   Phone [phone]   URINE RT REFLEX TO CULTURE       All other labs were within normal range or not returned as of this dictation. EMERGENCY DEPARTMENT COURSE and DIFFERENTIAL DIAGNOSIS/MDM:   Vitals:    Vitals:    06/16/21 0318 06/16/21 0330 06/16/21 0345 06/16/21 0400   BP: 123/66 (!) 141/72 137/74 132/75   Pulse: 60 63 60 63   Resp: 11 19 11 14   Temp: 98.6 °F (37 °C)      TempSrc: Oral      SpO2: 96% 95% 100% 100%   Weight:       Height:           Patient was given thefollowing medications:  Medications   fentaNYL (SUBLIMAZE) injection 25 mcg (25 mcg Intravenous Not Given 6/16/21 0300)   aspirin tablet 325 mg (has no administration in time range)   0.9 % sodium chloride bolus (has no administration in time range)       ED COURSE & MEDICAL DECISION MAKING    Pertinent Labs & Imaging studies reviewed. (See chart for details)   -  Patient seen and evaluated in the emergency department. -  Triage and nursing notes reviewed and incorporated. -  Old chart records reviewed and incorporated.   - Differential diagnosis includes: Differential Diagnosis: Acute Coronary Syndrome, Congestive Heart Failure, Thoracic Dissection, Pericarditis, Pericardial Effusion, Pulmonary Embolism, Pneumonia, Pneumothorax, Ischemic Bowel, Bowel Obstruction, PUD, GERD, Acute Cholecystitis, Pancreatitis, Hepatitis, Colitis, other    -  Work-up included:  See above  -  ED treatment included: See above  -  Results discussed with patient. Labs show slightly elevated renal function. Initial troponin within normal limits. EKG nondiagnostic for ACS. Imaging studies show no acute cardiopulmonary disease. Patient does have a heart score 5. He has not had any previous cardiac testing. .  Patient feels well on reevaluation but is having persistent pain. Discussed with the patient that based on his risk factors and age he is at risk for having CAD and I did recommend admission to the hospital. .  The patient is agreeable with plan of care and disposition. REASSESSMENT          CRITICAL CARE TIME   Total Critical Care time was 20 minutes, excluding separatelyreportable procedures. There was a high probability ofclinically significant/life threatening deterioration in the patient's condition which required my urgent intervention. CONSULTS:  None    PROCEDURES:  Unless otherwise noted below, none     Procedures    FINAL IMPRESSION      1. Chest pain, unspecified type    2. TYLER (acute kidney injury) ([facility] [address]          DISPOSITION/PLAN   DISPOSITION        PATIENT REFERREDTO:  No follow-up provider specified.     DISCHARGEMEDICATIONS:  New Prescriptions    No medications on file          (Please note that portions of this note were completed with a voice recognition program.  Efforts were made to edit the dictations but occasionally words are mis-transcribed.)    Brown Dillon MD (electronically signed)  Attending Emergency Physician          Brown Dillon MD  06/16/21 6000

## 2021-06-16 NOTE — ED NOTES
Bed: B-08  Expected date: 6/16/21  Expected time: 1:57 AM  Means of arrival: [address] [facility]  Comments:  Chest pain      Aster Easley RN  06/16/21 9143

## 2021-06-16 NOTE — CONSULTS
[facility]  Cardiology Consult Note        CC:    Chest pain      HPI:   This is a 72 y.o. male comes in for evaluation of chest pain that started last night. He calls it as \"muscular pain\". It was continuous without relief radiating to the back no nausea vomiting shortness of breath or diaphoresis. Troponins are negative EKG shows sinus rhythm with no ischemic changes    No past medical history on file. No past surgical history on file. No family history on file.    Social History     Tobacco Use    Smoking status: Never Smoker    Smokeless tobacco: Never Used   Vaping Use    Vaping Use: Never used   Substance Use Topics    Alcohol use: Not on file    Drug use: Not on file     No Known Allergies   fentanNYL  25 mcg Intravenous Once    aspirin  325 mg Oral Once    amiodarone  200 mg Oral BID    apixaban  5 mg Oral BID    sodium chloride flush  5-40 mL Intravenous 2 times per day    [START ON 6/17/2021] aspirin  81 mg Oral Daily    atorvastatin  80 mg Oral Nightly       Review of Systems -   Constitutional: Negative for weight gain/loss; malaise, fever  Respiratory: Negative for Asthma;  cough and hemoptysis  Cardiovascular: Negative for palpitations,dizziness   Gastrointestinal: Negative for abd.pain; constipation/diarrhea;    Genitourinary: Negative for stones; hematuria; frequency hesitancy  Integumentt: Negative for rash or pruritis  Hematologic/lymphatic: Negative for blood dyscrasia; leukemia/lymphoma  Musculoskeletal: Negative for Connective tissue disease  Neurological:  Negative for Seizure   Behavioral/Psych:Negative for Bipolar disorder, Schizophrenia; Dementia  Endocrine: negative for thyroid, parathyroid disease      Intake/Output Summary (Last 24 hours) at 6/16/2021 0952  Last data filed at 6/16/2021 0901  Gross per 24 hour   Intake 0 ml   Output 100 ml   Net -100 ml       Physical Examination:    BP (!) 150/79   Pulse 70   Temp 97.3 °F (36.3 °C) (Oral)   Resp 18   Ht 6' 5\" (1.956 m)   Wt 256 lb 13.4 oz (116.5 kg)   SpO2 98%   BMI 30.46 kg/m²    HEENT:  Face: Atraumatic, Conjunctiva: Pink; non icteric,  Mucous Memb:  Moist, No thyromegaly or Lymphadenopathy  Respiratory:  Resp Assessment: normal, Resp Auscultation: clear   Cardiovascular: Auscultation: nl S1 & S2, Palpation:  Nl PMI;  No heaves or thrills, JVP:  normal  Abdomen: Soft, non-tender, Normal bowel sounds,  No organomegaly  Extremities: No Cyanosis or Clubbing; Edema none  Neurological: Oriented to time, place, and person, Non-anxious  Psychiatric: Normal mood and affect  Skin: Warm and dry,  No rash seen      Current Facility-Administered Medications: fentaNYL (SUBLIMAZE) injection 25 mcg, 25 mcg, Intravenous, Once  aspirin tablet 325 mg, 325 mg, Oral, Once  amiodarone (CORDARONE) tablet 200 mg, 200 mg, Oral, BID  apixaban (ELIQUIS) tablet 5 mg, 5 mg, Oral, BID  sodium chloride flush 0.9 % injection 5-40 mL, 5-40 mL, Intravenous, 2 times per day  sodium chloride flush 0.9 % injection 5-40 mL, 5-40 mL, Intravenous, PRN  0.9 % sodium chloride infusion, 25 mL, Intravenous, PRN  ondansetron (ZOFRAN-ODT) disintegrating tablet 4 mg, 4 mg, Oral, Q8H PRN **OR** ondansetron (ZOFRAN) injection 4 mg, 4 mg, Intravenous, Q6H PRN  acetaminophen (TYLENOL) tablet 650 mg, 650 mg, Oral, Q6H PRN **OR** acetaminophen (TYLENOL) suppository 650 mg, 650 mg, Rectal, Q6H PRN  [START ON 6/17/2021] aspirin chewable tablet 81 mg, 81 mg, Oral, Daily  atorvastatin (LIPITOR) tablet 80 mg, 80 mg, Oral, Nightly  morphine (PF) injection 4 mg, 4 mg, Intravenous, Q4H PRN  regadenoson (LEXISCAN) injection 0.4 mg, 0.4 mg, Intravenous, ONCE PRN  technetium tetrofosmin (Tc-MYOVIEW) injection 30 millicurie, 30 millicurie, Intravenous, ONCE PRN      Labs:   Recent Labs     06/16/21 0215   WBC 8.7   HGB 14.6   HCT 42.3        Recent Labs     06/16/21 0215      K 4.2   CO2 26   BUN 45*   CREATININE 1.7*   GLUCOSE 125*     No results for input(s): BNP in the last 72 hours. No results for input(s): PROTIME, INR in the last 72 hours. No results for input(s): APTT in the last 72 hours.   Recent Labs     06/16/21 0215 06/16/21 0638   TROPONINI <0.01 <0.01     Lab Results   Component Value Date    HDL 35 04/14/2021    LDLCALC 93 04/14/2021    TRIG 90 04/14/2021     Recent Labs     06/16/21 0215   AST 14*   ALT 15   LABALBU 4.0         EKG:   NSR      Stress Nuclear:6/16/2021  Negative for ischemia      ASSESSMENT AND PLAN:      Patient with a history of atrial fibrillation and severe cardiomyopathy probably the result of tachycardia induced    Chest pain is atypical for angina  No enzyme elevation or ischemic ST changes  Stress nuclear scan is negative for ischemia      Creatinine is 1.7  We will DC torsemide  Encouraged to drink fluids  Continue chlorthalidone and spironolactone  Repeat renal panel as outpatient    Atrial fibrillation  Status post cardioversion  Remains in sinus rhythm      Heather Doyle M.D  6/16/2021

## 2021-06-16 NOTE — PROGRESS NOTES
Pt arrived back from stress test, pain about 2/10 and tolerable. Aware to remain NPO until results.  Electronically signed by Kavon Ann RN on 6/16/2021 at 11:51 AM

## 2021-06-16 NOTE — PROGRESS NOTES
RN reassessed patient's pain-no change chest pain is 4/10. Patient refuses pain medications at this time. RN applied a warm blanket for comfort.

## 2021-06-16 NOTE — ED TRIAGE NOTES
Patient arrived to ED BPOD Rm8 via stretcher by Kirit Raines from home with concerns of chest pain. Patient states that his chest pain 4/10 mid upper sternal pain. Patient states that it feels like a \"pulled muscle. \"  The pain started approximately an hour before arrival.  EMS in route gave 325mg of ASA PO and 1 sublingual Nitro. Patient states that it did not help with the chest pain. Patient remains having chest pain 4/10. Patient is alert and oriented x 4, denies abdominal pain, leg pain, and states that he is not SOB. Patient has some SOB with exertion and shallow breathing. Oxygenation 92-95% on RA.   RN applied [facility] for comfort

## 2021-06-17 VITALS
RESPIRATION RATE: 16 BRPM | BODY MASS INDEX: 29.52 KG/M2 | HEIGHT: 77 IN | TEMPERATURE: 97.8 F | HEART RATE: 93 BPM | WEIGHT: 250 LBS | SYSTOLIC BLOOD PRESSURE: 105 MMHG | DIASTOLIC BLOOD PRESSURE: 61 MMHG | OXYGEN SATURATION: 93 %

## 2021-06-17 LAB
ANION GAP SERPL CALCULATED.3IONS-SCNC: 8 MMOL/L (ref 3–16)
BASOPHILS ABSOLUTE: 0 K/UL (ref 0–0.2)
BASOPHILS RELATIVE PERCENT: 0.5 %
BUN BLDV-MCNC: 29 MG/DL (ref 7–20)
CALCIUM SERPL-MCNC: 8.5 MG/DL (ref 8.3–10.6)
CHLORIDE BLD-SCNC: 101 MMOL/L (ref 99–110)
CO2: 28 MMOL/L (ref 21–32)
CREAT SERPL-MCNC: 1.3 MG/DL (ref 0.8–1.3)
EKG ATRIAL RATE: 65 BPM
EKG DIAGNOSIS: NORMAL
EKG P AXIS: -5 DEGREES
EKG P-R INTERVAL: 148 MS
EKG Q-T INTERVAL: 468 MS
EKG QRS DURATION: 108 MS
EKG QTC CALCULATION (BAZETT): 486 MS
EKG R AXIS: 34 DEGREES
EKG T AXIS: 46 DEGREES
EKG VENTRICULAR RATE: 65 BPM
EOSINOPHILS ABSOLUTE: 0.3 K/UL (ref 0–0.6)
EOSINOPHILS RELATIVE PERCENT: 3.8 %
GFR AFRICAN AMERICAN: >60
GFR NON-AFRICAN AMERICAN: 55
GLUCOSE BLD-MCNC: 127 MG/DL (ref 70–99)
HCT VFR BLD CALC: 40 % (ref 40.5–52.5)
HEMOGLOBIN: 14.1 G/DL (ref 13.5–17.5)
LYMPHOCYTES ABSOLUTE: 1.8 K/UL (ref 1–5.1)
LYMPHOCYTES RELATIVE PERCENT: 22.1 %
MAGNESIUM: 2.3 MG/DL (ref 1.8–2.4)
MCH RBC QN AUTO: 30.5 PG (ref 26–34)
MCHC RBC AUTO-ENTMCNC: 35.2 G/DL (ref 31–36)
MCV RBC AUTO: 86.7 FL (ref 80–100)
MONOCYTES ABSOLUTE: 1.1 K/UL (ref 0–1.3)
MONOCYTES RELATIVE PERCENT: 13.4 %
NEUTROPHILS ABSOLUTE: 4.8 K/UL (ref 1.7–7.7)
NEUTROPHILS RELATIVE PERCENT: 60.2 %
PDW BLD-RTO: 13.8 % (ref 12.4–15.4)
PLATELET # BLD: 163 K/UL (ref 135–450)
PMV BLD AUTO: 8.2 FL (ref 5–10.5)
POTASSIUM SERPL-SCNC: 4.7 MMOL/L (ref 3.5–5.1)
RBC # BLD: 4.62 M/UL (ref 4.2–5.9)
SODIUM BLD-SCNC: 137 MMOL/L (ref 136–145)
URINE CULTURE, ROUTINE: NORMAL
WBC # BLD: 8 K/UL (ref 4–11)

## 2021-06-17 PROCEDURE — G0378 HOSPITAL OBSERVATION PER HR: HCPCS

## 2021-06-17 PROCEDURE — 93010 ELECTROCARDIOGRAM REPORT: CPT | Performed by: INTERNAL MEDICINE

## 2021-06-17 PROCEDURE — 2580000003 HC RX 258: Performed by: STUDENT IN AN ORGANIZED HEALTH CARE EDUCATION/TRAINING PROGRAM

## 2021-06-17 PROCEDURE — 80048 BASIC METABOLIC PNL TOTAL CA: CPT

## 2021-06-17 PROCEDURE — 36415 COLL VENOUS BLD VENIPUNCTURE: CPT

## 2021-06-17 PROCEDURE — 93005 ELECTROCARDIOGRAM TRACING: CPT | Performed by: STUDENT IN AN ORGANIZED HEALTH CARE EDUCATION/TRAINING PROGRAM

## 2021-06-17 PROCEDURE — 83735 ASSAY OF MAGNESIUM: CPT

## 2021-06-17 PROCEDURE — 6370000000 HC RX 637 (ALT 250 FOR IP): Performed by: STUDENT IN AN ORGANIZED HEALTH CARE EDUCATION/TRAINING PROGRAM

## 2021-06-17 PROCEDURE — 85025 COMPLETE CBC W/AUTO DIFF WBC: CPT

## 2021-06-17 RX ADMIN — AMIODARONE HYDROCHLORIDE 200 MG: 200 TABLET ORAL at 08:14

## 2021-06-17 RX ADMIN — ASPIRIN 81 MG: 81 TABLET, CHEWABLE ORAL at 08:14

## 2021-06-17 RX ADMIN — APIXABAN 5 MG: 5 TABLET, FILM COATED ORAL at 08:14

## 2021-06-17 RX ADMIN — Medication 10 ML: at 08:15

## 2021-06-17 ASSESSMENT — PAIN SCALES - GENERAL: PAINLEVEL_OUTOF10: 0

## 2021-06-17 NOTE — DISCHARGE SUMMARY
Hospital Medicine Discharge Summary    Patient ID: Leah Mccord      Patient's PCP: Juan F Rivera Date: 6/16/2021     Discharge Date:   6/17/21    Admitting Physician: Roopa Hussein DO     Discharge Physician: Mamadou Erwin MD     Discharge Diagnoses: Active Hospital Problems    Diagnosis Date Noted    Chest pain [R07.9] 06/16/2021       The patient was seen and examined on day of discharge and this discharge summary is in conjunction with any daily progress note from day of discharge. Hospital Course: The patient is a 72 y.o. male who presents to [facility] with chest pain. Patient states his chest pain started around midnight last night was located substernally. Denies it feeling like a burning, sharp, stabbing pain. Denies any kind of chest tightness either. He says it felt more like an ache. It was not associated with any nausea vomiting, diaphoresis or shortness of breath. The pain did get worse with movement and was relieved by morphine. Patient came to the emergency department and had troponins drawn which were negative and an EKG which did not show any acute changes. Stress test was ordered and came back negative. His troponins were trended and were also found to be negative. His renal function was double what it was on discharge over a month ago. He admits to drinking less fluids while still taking his chlorthalidone, Aldactone, torsemide. Cardiology was consulted for his chest pain along with his adjustments to at home medications. Cardiology agreed with adjustment of medications with discontinuation of home torsemide. Patient will continue on chlorthalidone, Aldactone and will restart his lisinopril on Saturday the 19th. Patient's stress test came back normal with no signs of acute ischemia. Patient is stable for discharge home with outpatient follow-up with cardiology. Renal panel has been ordered for next week to reassess his kidney function. After some IV fluids and medication with holding his creatinine came down from 1.7-1.3.     Acute renal failure  Likely secondary to poor p.o. intake along with medications   give IV fluids back  Hold home medications, restart medications at discharge  Recheck in the morning improved     Chest pain  Negative troponin, negative EKG  Stress test negative     Paroxysmal atrial fibrillation  Status post cardioversion in April  Remains in normal sinus rhythm  Continue Eliquis and amiodarone     Chronic systolic and diastolic heart failure  Not currently in exacerbation  Hold home medications in light of TYLER  Restart medications at discharge  Discharge without torsemide      Exam:     /61   Pulse 93   Temp 97.8 °F (36.6 °C) (Oral)   Resp 16   Ht 6' 5\" (1.956 m)   Wt 250 lb (113.4 kg)   SpO2 93%   BMI 29.65 kg/m²     General appearance: No apparent distress, appears stated age and cooperative. HEENT: Pupils equal, round, and reactive to light. Conjunctivae/corneas clear. Neck: Supple, with full range of motion. No jugular venous distention. Trachea midline. Respiratory:  Normal respiratory effort. Clear to auscultation, bilaterally without Rales/Wheezes/Rhonchi. Cardiovascular: Regular rate and rhythm with normal S1/S2 without murmurs, rubs or gallops. Abdomen: Soft, non-tender, non-distended with normal bowel sounds. Musculoskeletal: No clubbing, cyanosis or edema bilaterally. Full range of motion without deformity. Skin: Skin color, texture, turgor normal.  No rashes or lesions. Neurologic:  Neurovascularly intact without any focal sensory/motor deficits. Cranial nerves: II-XII intact, grossly non-focal.  Psychiatric: Alert and oriented, thought content appropriate, normal insight      Consults:     IP CONSULT TO CARDIOLOGY    Significant Diagnostic Studies:     NM Cardiac Stress Test Nuclear Imaging   Final Result      XR CHEST PORTABLE   Final Result   No acute disease.              Disposition: Home    Condition at Discharge: Stable    Discharge Instructions/Follow-up: PCP, cardiology, renal panel next week    Code Status:  Full Code     Activity: activity as tolerated    Diet: cardiac diet      Labs: For convenience and continuity at follow-up the following most recent labs are provided:      CBC:    Lab Results   Component Value Date    WBC 8.0 06/17/2021    HGB 14.1 06/17/2021    HCT 40.0 06/17/2021     06/17/2021       Renal:    Lab Results   Component Value Date     06/17/2021    K 4.7 06/17/2021    K 4.2 06/16/2021     06/17/2021    CO2 28 06/17/2021    BUN 29 06/17/2021    CREATININE 1.3 06/17/2021    CALCIUM 8.5 06/17/2021       Discharge Medications:     Current Discharge Medication List           Details   amiodarone (CORDARONE) 200 MG tablet TAKE 1 TABLET BY MOUTH TWICE DAILY  Qty: 60 tablet, Refills: 0      apixaban (ELIQUIS) 5 MG TABS tablet Take 1 tablet by mouth 2 times daily  Qty: 60 tablet, Refills: 1      lisinopril (PRINIVIL;ZESTRIL) 20 MG tablet Take 1 tablet by mouth daily  Qty: 30 tablet, Refills: 3      chlorthalidone (HYGROTON) 25 MG tablet Take 1 tablet by mouth daily  Qty: 30 tablet, Refills: 3      atenolol (TENORMIN) 50 MG tablet Take 1 tablet by mouth daily  Qty: 30 tablet, Refills: 3      spironolactone (ALDACTONE) 25 MG tablet Take 1 tablet by mouth daily  Qty: 30 tablet, Refills: 3             Future Appointments   Date Time Provider Mame Montes   7/29/2021  8:30 AM Yomaira Faulkner MD [facility]       Time Spent on discharge is more than 30 minutes in the examination, evaluation, counseling and review of medications and discharge plan. Signed:    Migue Sharma MD   6/17/2021      Thank you Leonard Mccall for the opportunity to be involved in this patient's care. If you have any questions or concerns please feel free to contact me at [phone].

## 2021-06-17 NOTE — PLAN OF CARE
Problem: Falls - Risk of:  Goal: Will remain free from falls  Description: Will remain free from falls  6/16/2021 0754 by Marilee Xiao RN  Outcome: Ongoing     Problem: Falls - Risk of:  Goal: Absence of physical injury  Description: Absence of physical injury  6/16/2021 0754 by Marilee Xiao RN  Outcome: Ongoing     Problem: Pain:  Goal: Pain level will decrease  Description: Pain level will decrease  6/16/2021 0754 by Marilee Xiao RN  Outcome: Ongoing     Problem: Pain:  Goal: Control of acute pain  Description: Control of acute pain  6/16/2021 0754 by Marilee Xiao RN  Outcome: Ongoing     Problem: Pain:  Goal: Control of chronic pain  Description: Control of chronic pain  6/16/2021 0754 by Marilee Xiao RN  Outcome: Ongoing
Problem: Falls - Risk of:  Goal: Will remain free from falls  Description: Will remain free from falls  Outcome: Ongoing  Goal: Absence of physical injury  Description: Absence of physical injury  Outcome: Ongoing     Problem: Pain:  Goal: Pain level will decrease  Description: Pain level will decrease  Outcome: Ongoing  Goal: Control of acute pain  Description: Control of acute pain  Outcome: Ongoing  Goal: Control of chronic pain  Description: Control of chronic pain  Outcome: Ongoing
INTOLERANCE/IMPAIRED MOBILITY  Goal: Mobility/activity is maintained at optimum level for patient  6/17/2021 1025 by Jacy Osullivan RN  Outcome: Completed  6/16/2021 2340 by Gal Vasquez, RN  Outcome: Ongoing     Problem: Discharge Planning:  Goal: Discharged to appropriate level of care  Description: Discharged to appropriate level of care  6/17/2021 1025 by Jacy Osullivan RN  Outcome: Completed  6/16/2021 2340 by Gal Vasquez, RN  Outcome: Ongoing

## 2021-06-17 NOTE — PROGRESS NOTES
Reviewed dc instructions with pt. Pt verbalized understanding. PIV removed. Dressing clean dry and intact. Pt dc to private residence. ambulatory. To vehicle. Pt dc with personal belongings.

## 2021-06-17 NOTE — CARE COORDINATION
INITIAL CASE MANAGEMENT ASSESSMENT     Reviewed chart, met with patient to assess possible discharge needs. Explained Case Management role/services. SW interviewed patient via telephone today.     Living Situation: Patient resides in a two level home with no pets. There are three stairs with a railing to enter the property. Prior to medical admission patient reports that he had no trouble getting in or out of the home.     ADLs: Prior to medical admission patient reports that he was independent. He stated that he doesn't anticipate any needs at discharge.      DME: Prior to medical admission patient reports that he had a walker and a cane but he does not use them. He stated that he doesn't anticipate any equipment needs at discharge.       PT/OT Recs: Not ordered.     Active Services: Prior to medical admission patient reports that he had no active services in place. He stated that he doesn't anticipate any needs at discharge.     Transportation: Prior to medical admission patient reports that he was an active . He stated that his sister will likely transport him home at  discharge.     Medications: Patient receives medications from [facility] on [address] [facility]. At this time there are no issues with access or affordability. He is agreeable to using [facility] at [facility] for any new medications at discharge. .      PCP: Skylar Bryan [phone] (phone) and [phone] (fax number). Patient reports that his next appointment with this provider is in August of 2021.      HD/PD: N/A     PLAN/COMMENTS:   1) Monitor for cardiology clearance. 2) Discharge to home with family.     SW provided contact information for patient or family to call with any questions. SW will follow and assist as needed. Respectfully submitted,    BAILEE Verde  [facility]   [phone]    Electronically signed by BAILEE Cifuentes on 6/17/2021 at 8:35 AM

## 2021-06-21 DIAGNOSIS — I50.22 CHRONIC SYSTOLIC HEART FAILURE (HCC): ICD-10-CM

## 2021-06-21 DIAGNOSIS — N17.9 AKI (ACUTE KIDNEY INJURY) (HCC): ICD-10-CM

## 2021-06-21 LAB
ALBUMIN SERPL-MCNC: 4.5 G/DL (ref 3.4–5)
ANION GAP SERPL CALCULATED.3IONS-SCNC: 16 MMOL/L (ref 3–16)
BASOPHILS ABSOLUTE: 0.1 K/UL (ref 0–0.2)
BASOPHILS RELATIVE PERCENT: 1.1 %
BUN BLDV-MCNC: 29 MG/DL (ref 7–20)
CALCIUM SERPL-MCNC: 9.7 MG/DL (ref 8.3–10.6)
CHLORIDE BLD-SCNC: 103 MMOL/L (ref 99–110)
CO2: 24 MMOL/L (ref 21–32)
CREAT SERPL-MCNC: 1.5 MG/DL (ref 0.8–1.3)
EOSINOPHILS ABSOLUTE: 0.3 K/UL (ref 0–0.6)
EOSINOPHILS RELATIVE PERCENT: 4.7 %
GFR AFRICAN AMERICAN: 56
GFR NON-AFRICAN AMERICAN: 46
GLUCOSE BLD-MCNC: 116 MG/DL (ref 70–99)
HCT VFR BLD CALC: 42.2 % (ref 40.5–52.5)
HEMOGLOBIN: 14.6 G/DL (ref 13.5–17.5)
LYMPHOCYTES ABSOLUTE: 1.8 K/UL (ref 1–5.1)
LYMPHOCYTES RELATIVE PERCENT: 25.6 %
MCH RBC QN AUTO: 30.3 PG (ref 26–34)
MCHC RBC AUTO-ENTMCNC: 34.6 G/DL (ref 31–36)
MCV RBC AUTO: 87.6 FL (ref 80–100)
MONOCYTES ABSOLUTE: 0.7 K/UL (ref 0–1.3)
MONOCYTES RELATIVE PERCENT: 10.2 %
NEUTROPHILS ABSOLUTE: 4.2 K/UL (ref 1.7–7.7)
NEUTROPHILS RELATIVE PERCENT: 58.4 %
PDW BLD-RTO: 14.1 % (ref 12.4–15.4)
PHOSPHORUS: 3.7 MG/DL (ref 2.5–4.9)
PLATELET # BLD: 248 K/UL (ref 135–450)
PMV BLD AUTO: 8.7 FL (ref 5–10.5)
POTASSIUM SERPL-SCNC: 5.4 MMOL/L (ref 3.5–5.1)
PRO-BNP: 435 PG/ML (ref 0–124)
RBC # BLD: 4.81 M/UL (ref 4.2–5.9)
SODIUM BLD-SCNC: 143 MMOL/L (ref 136–145)
WBC # BLD: 7.1 K/UL (ref 4–11)

## 2021-07-06 RX ORDER — AMIODARONE HYDROCHLORIDE 200 MG/1
TABLET ORAL
Qty: 60 TABLET | Refills: 5 | Status: SHIPPED | OUTPATIENT
Start: 2021-07-06 | End: 2022-01-20

## 2021-07-06 RX ORDER — APIXABAN 5 MG/1
TABLET, FILM COATED ORAL
Qty: 60 TABLET | Refills: 5 | Status: SHIPPED | OUTPATIENT
Start: 2021-07-06 | End: 2022-01-19

## 2021-07-06 NOTE — TELEPHONE ENCOUNTER
Last OV: 04/22/2021  Next OV: 07/29/2021  most recent Labs: CMP 06/16/2021, TSH  04/12/2021  Last PT/INR (if needed):-  Last EKG (if needed):06/16/2021

## 2021-07-28 NOTE — PROGRESS NOTES
disease  Neurological:  Negative for Seizure   Behavioral/Psych:Negative for Bipolar disorder, Schizophrenia; Dementia  Endocrine: negative for thyroid, parathyroid disease    Physical Examination:    /72   Pulse 58   Ht 6' 5\" (1.956 m)   Wt 250 lb (113.4 kg) Comment: with shoes  BMI 29.65 kg/m²    HEENT:  Face: Atraumatic, Conjunctiva: Pink; non icteric,  Mucous Memb:  Moist, No thyromegaly or Lymphadenopathy  Respiratory:  Resp Assessment: normal, Resp Auscultation: clear  Cardiovascular: Auscultation: nl S1 & S2, Palpation:  Nl PMI; No heaves or thrills, JVP:  normal  Abdomen: Soft, non-tender, Normal bowel sounds,  No organomegaly  Extremities: No Cyanosis or Clubbing  Neurological: Oriented to time, place, and person, Non-anxious  Psychiatric: Normal mood and affect  Skin: Warm and dry,  No rash seen     Outpatient Medications Marked as Taking for the 21 encounter (Office Visit) with Kimberlee Sandoval MD   Medication Sig Dispense Refill    ELIQUIS 5 MG TABS tablet TAKE 1 TABLET BY MOUTH TWICE DAILY 60 tablet 5    amiodarone (CORDARONE) 200 MG tablet TAKE 1 TABLET BY MOUTH TWICE DAILY 60 tablet 5    lisinopril (PRINIVIL;ZESTRIL) 20 MG tablet Take 1 tablet by mouth daily 30 tablet 3    chlorthalidone (HYGROTON) 25 MG tablet Take 1 tablet by mouth daily 30 tablet 3    atenolol (TENORMIN) 50 MG tablet Take 1 tablet by mouth daily 30 tablet 3    spironolactone (ALDACTONE) 25 MG tablet Take 1 tablet by mouth daily 30 tablet 3         Labs:   Lab Results   Component Value Date    HDL 35 2021    LDLCALC 93 2021    TRIG 90 2021         EK21, sinus bradycardia rate 59  21, sinus bradycardia, rate 57    Chest X-Ray: 2021  Impression   Bibasilar airspace disease most consistent with pneumonia.      ECHO: 2021  Suboptimal image quality. Normal left ventricular size and wall thickness.  LVEF is difficult to assess  due to arrhythmia but appears mildly reduced with global systolic  dysfunction.  LVEF 45-50%.  Unable to exclude regional wall motion abnormalities. Indeterminate diastolic function due to atrial fibrillation. The right ventricle is normal in size and function. Mild mitral regurgitation is present.     DCCV: 4/14/21  Successful external DC cardioversion of atrial fibrillation. Stress test: 6/16/21      Summary    Normal stress myocardial perfusion.    Overall findings represent a low risk scan.        Recommendation    risk factor modification    medical therapy         ASSESSMENT AND PLAN:    Cardiomyopathy/heart failure   EF is 45 to 50% (2021)  Weight and breathing stable   Continue GDMT; chlorthalidone, atenolol and aldactone   Has a dry cough and therefore will discontinue lisinopril and start Valsartan 160 mg daily   Appears compensated   NYHA Class II  Repeat BMP  Repeat Echo to assess LVEF     AF with RVR:   s/p JAMIA/Cardioversion on 4/14/21  On Amiodarone and Atenolol  CHADS2 score is 3 (age, CHF, hypertension); Eliquis for thromboembolic risk reduction   Given history of LV dysfunction and CHF, recurrence of A. fib is hazardous     Hypertension  BP goal <130/80  BP controlled today     Personally reviewed testing and labs. Follow up in 6 months        Thank you very much for allowing me to participate in the care of your patient. Please do not hesitate to contact me if you have any questions. Sincerely,    Shravan Boudreaux M.D  [facility], [address] [place] [address]  Ph: [phone]  Fax: [phone]    This note was scribed in the presence of Dr. Shravan Boudreaux MD by Tomi Wilder  Physician Attestation:  The scribes documentation has been prepared under my direction and personally reviewed by me in its entirety. I confirm that the note above accurately reflects all work, treatment, procedures, and medical decision making performed by me.

## 2021-07-29 ENCOUNTER — OFFICE VISIT (OUTPATIENT)
Dept: CARDIOLOGY CLINIC | Age: 71
End: 2021-07-29
Payer: MEDICARE

## 2021-07-29 VITALS
HEART RATE: 58 BPM | BODY MASS INDEX: 29.52 KG/M2 | DIASTOLIC BLOOD PRESSURE: 72 MMHG | SYSTOLIC BLOOD PRESSURE: 126 MMHG | HEIGHT: 77 IN | WEIGHT: 250 LBS

## 2021-07-29 DIAGNOSIS — I42.8 NON-ISCHEMIC CARDIOMYOPATHY (HCC): ICD-10-CM

## 2021-07-29 DIAGNOSIS — I42.8 NON-ISCHEMIC CARDIOMYOPATHY (HCC): Primary | ICD-10-CM

## 2021-07-29 DIAGNOSIS — I48.0 PAF (PAROXYSMAL ATRIAL FIBRILLATION) (HCC): ICD-10-CM

## 2021-07-29 DIAGNOSIS — I10 ESSENTIAL HYPERTENSION: ICD-10-CM

## 2021-07-29 LAB
A/G RATIO: 1.4 (ref 1.1–2.2)
ALBUMIN SERPL-MCNC: 4.4 G/DL (ref 3.4–5)
ALP BLD-CCNC: 88 U/L (ref 40–129)
ALT SERPL-CCNC: 20 U/L (ref 10–40)
ANION GAP SERPL CALCULATED.3IONS-SCNC: 14 MMOL/L (ref 3–16)
AST SERPL-CCNC: 18 U/L (ref 15–37)
BILIRUB SERPL-MCNC: 0.6 MG/DL (ref 0–1)
BUN BLDV-MCNC: 23 MG/DL (ref 7–20)
CALCIUM SERPL-MCNC: 9.4 MG/DL (ref 8.3–10.6)
CHLORIDE BLD-SCNC: 98 MMOL/L (ref 99–110)
CO2: 25 MMOL/L (ref 21–32)
CREAT SERPL-MCNC: 1.8 MG/DL (ref 0.8–1.3)
GFR AFRICAN AMERICAN: 45
GFR NON-AFRICAN AMERICAN: 37
GLOBULIN: 3.2 G/DL
GLUCOSE BLD-MCNC: 101 MG/DL (ref 70–99)
POTASSIUM SERPL-SCNC: 4.7 MMOL/L (ref 3.5–5.1)
SODIUM BLD-SCNC: 137 MMOL/L (ref 136–145)
TOTAL PROTEIN: 7.6 G/DL (ref 6.4–8.2)

## 2021-07-29 PROCEDURE — 1123F ACP DISCUSS/DSCN MKR DOCD: CPT | Performed by: INTERNAL MEDICINE

## 2021-07-29 PROCEDURE — 1036F TOBACCO NON-USER: CPT | Performed by: INTERNAL MEDICINE

## 2021-07-29 PROCEDURE — G8417 CALC BMI ABV UP PARAM F/U: HCPCS | Performed by: INTERNAL MEDICINE

## 2021-07-29 PROCEDURE — G8427 DOCREV CUR MEDS BY ELIG CLIN: HCPCS | Performed by: INTERNAL MEDICINE

## 2021-07-29 PROCEDURE — 4040F PNEUMOC VAC/ADMIN/RCVD: CPT | Performed by: INTERNAL MEDICINE

## 2021-07-29 PROCEDURE — 3017F COLORECTAL CA SCREEN DOC REV: CPT | Performed by: INTERNAL MEDICINE

## 2021-07-29 PROCEDURE — 99214 OFFICE O/P EST MOD 30 MIN: CPT | Performed by: INTERNAL MEDICINE

## 2021-07-29 PROCEDURE — 93000 ELECTROCARDIOGRAM COMPLETE: CPT | Performed by: INTERNAL MEDICINE

## 2021-07-29 RX ORDER — VALSARTAN 160 MG/1
160 TABLET ORAL DAILY
Qty: 90 TABLET | Refills: 3 | Status: SHIPPED | OUTPATIENT
Start: 2021-07-29 | End: 2022-07-21

## 2021-07-29 NOTE — PATIENT INSTRUCTIONS
Patient Education        Heart Failure: Care Instructions  Your Care Instructions     Heart failure occurs when your heart does not pump as much blood as the body needs. Failure does not mean that the heart has stopped pumping but rather that it is not pumping as well as it should. Over time, this causes fluid buildup in your lungs and other parts of your body. Fluid buildup can cause shortness of breath, fatigue, swollen ankles, and other problems. By taking medicines regularly, reducing sodium (salt) in your diet, checking your weight every day, and making lifestyle changes, you can feel better and live longer. Follow-up care is a key part of your treatment and safety. Be sure to make and go to all appointments, and call your doctor if you are having problems. It's also a good idea to know your test results and keep a list of the medicines you take. How can you care for yourself at home? Medicines    · Be safe with medicines. Take your medicines exactly as prescribed. Call your doctor if you think you are having a problem with your medicine.     · Do not take any vitamins, over-the-counter medicine, or herbal products without talking to your doctor first. Jenny Watkinss not take ibuprofen (Advil or Motrin) and naproxen (Aleve) without talking to your doctor first. They could make your heart failure worse.     · You may take some of the following medicine. ? Angiotensin-converting enzyme inhibitors (ACEIs) or angiotensin II receptor blockers (ARBs) reduce the heart's workload, lower blood pressure, and reduce swelling. Taking an ACEI or ARB may lower your chance of needing to be hospitalized. ? Beta-blockers can slow heart rate, decrease blood pressure, and improve your condition. Taking a beta-blocker may lower your chance of needing to be hospitalized. ? Diuretics, also called water pills, reduce swelling. You will get more details on the specific medicines your doctor prescribes.   Diet    · Your doctor may suggest that you limit sodium. Your doctor can tell you how much sodium is right for you. An example is less than 3,000 mg a day. This includes all the salt you eat in cooking or in packaged foods. People get most of their sodium from processed foods. Fast food and restaurant meals also tend to be very high in sodium.     · Ask your doctor how much liquid you can drink each day. You may have to limit liquids. Weight    · Weigh yourself without clothing at the same time each day. Record your weight. Call your doctor if you have a sudden weight gain, such as more than 2 to 3 pounds in a day or 5 pounds in a week. (Your doctor may suggest a different range of weight gain.) A sudden weight gain may mean that your heart failure is getting worse. Activity level    · Start light exercise (if your doctor says it is okay). Even if you can only do a small amount, exercise will help you get stronger, have more energy, and manage your weight and your stress. Walking is an easy way to get exercise. Start out by walking a little more than you did before. Bit by bit, increase the amount you walk.     · When you exercise, watch for signs that your heart is working too hard. You are pushing yourself too hard if you cannot talk while you are exercising. If you become short of breath or dizzy or have chest pain, stop, sit down, and rest.     · If you feel \"wiped out\" the day after you exercise, walk slower or for a shorter distance until you can work up to a better pace.     · Get enough rest at night. Sleeping with 1 or 2 pillows under your upper body and head may help you breathe easier. Lifestyle changes    · Do not smoke. Smoking can make a heart condition worse. If you need help quitting, talk to your doctor about stop-smoking programs and medicines. These can increase your chances of quitting for good.  Quitting smoking may be the most important step you can take to protect your heart.     · Limit alcohol to 2 drinks a day for men and 1 drink a day for women. Too much alcohol can cause health problems.     · Avoid getting sick from colds and the flu. Get a pneumococcal vaccine shot. If you have had one before, ask your doctor whether you need another dose. Get a flu shot each year. If you must be around people with colds or the flu, wash your hands often. When should you call for help? Call 911  if you have symptoms of sudden heart failure such as:    · You have severe trouble breathing.     · You cough up pink, foamy mucus.     · You have a new irregular or rapid heartbeat. Call your doctor now or seek immediate medical care if:    · You have new or increased shortness of breath.     · You are dizzy or lightheaded, or you feel like you may faint.     · You have sudden weight gain, such as more than 2 to 3 pounds in a day or 5 pounds in a week. (Your doctor may suggest a different range of weight gain.)     · You have increased swelling in your legs, ankles, or feet.     · You are suddenly so tired or weak that you cannot do your usual activities. Watch closely for changes in your health, and be sure to contact your doctor if you develop new symptoms. Where can you learn more? Go to https://[email or web address].[employer]. org and sign in to your Tingz account. Enter D718 in the [facility] box to learn more about \"Heart Failure: Care Instructions. \"     If you do not have an account, please click on the \"Sign Up Now\" link. Current as of: August 31, 2020               Content Version: 12.9  © 2006-2021 Healthwise, Incorporated. Care instructions adapted under license by [facility] ([facility]). If you have questions about a medical condition or this instruction, always ask your healthcare professional. [place] [address] any warranty or liability for your use of this information.

## 2021-08-13 DIAGNOSIS — I42.8 NON-ISCHEMIC CARDIOMYOPATHY (HCC): Primary | ICD-10-CM

## 2021-08-27 ENCOUNTER — HOSPITAL ENCOUNTER (OUTPATIENT)
Dept: NON INVASIVE DIAGNOSTICS | Age: 71
Discharge: HOME | End: 2021-08-27
Payer: MEDICARE

## 2021-08-27 DIAGNOSIS — I42.8 NON-ISCHEMIC CARDIOMYOPATHY (HCC): ICD-10-CM

## 2021-08-27 PROCEDURE — 93308 TTE F-UP OR LMTD: CPT

## 2021-09-01 RX ORDER — SPIRONOLACTONE 25 MG/1
25 TABLET ORAL DAILY
Qty: 90 TABLET | Refills: 0 | Status: SHIPPED | OUTPATIENT
Start: 2021-09-01 | End: 2021-12-03

## 2021-09-01 RX ORDER — ATENOLOL 50 MG/1
50 TABLET ORAL DAILY
Qty: 90 TABLET | Refills: 0 | Status: SHIPPED | OUTPATIENT
Start: 2021-09-01 | End: 2021-12-03

## 2021-09-01 RX ORDER — CHLORTHALIDONE 25 MG/1
25 TABLET ORAL DAILY
Qty: 90 TABLET | Refills: 0 | Status: SHIPPED | OUTPATIENT
Start: 2021-09-01 | End: 2021-12-03

## 2021-09-01 NOTE — TELEPHONE ENCOUNTER
Last ov 7/29/21  Pending appt due in January, 2022 schedule not out yet  Last refill all meds 5/6/21 #30x3  Last labs 8/13/21

## 2021-09-27 NOTE — PROGRESS NOTES
[address] [facility] time__10/4/21 0930__________        Surgery time__1030__________    Take the following medications with a sip of water:    Do not eat or drink anything after 12:00 midnight prior to your surgery. This includes water chewing gum, mints and ice chips. You may brush your teeth and gargle the morning of your surgery, but do not swallow the water     Please see your family doctor/pediatrician for a history and physical and/or concerning medications. Bring any test results/reports from your physicians office. If you are under the care of a heart doctor or specialist doctor, please be aware that you may be asked to them for clearance    You may be asked to stop blood thinners such as Coumadin, Plavix, Fragmin, Lovenox, etc., or any anti-inflammatories such as:  Aspirin, Ibuprofen, Advil, Naproxen prior to your surgery. We also ask that you stop any OTC medications such as fish oil, vitamin E, glucosamine, garlic, Multivitamins, COQ 10, etc.    We ask that you do not smoke 24 hours prior to surgery  We ask that you do not  drink any alcoholic beverages 24 hours prior to surgery     You must make arrangements for a responsible adult to take you home after your surgery. For your safety you will not be allowed to leave alone or drive yourself home. Your surgery will be cancelled if you do not have a ride home. Also for your safety, it is strongly suggested that someone stay with you the first 24 hours after your surgery. A parent or legal guardian must accompany a child scheduled for surgery and plan to stay at the hospital until the child is discharged. Please do not bring other children with you. For your comfort, please wear simple loose fitting clothing to the hospital.  Please do not bring valuables.     Do not wear any make-up or nail polish on your fingers or toes      For your safety, please do not wear any jewelry or body piercing's on the day of surgery. All jewelry must be removed. If you have dentures, they will be removed before going to operating room. For your convenience, we will provide you with a container. If you wear contact lenses or glasses, they will be removed, please bring a case for them. If you have a living will and a durable power of  for healthcare, please bring in a copy. As part of our patient safety program to minimize surgical site infections, we ask you to do the following:    · Please notify your surgeon if you develop any illness between         now and the  day of your surgery. · This includes a cough, cold, fever, sore throat, nausea,         or vomiting, and diarrhea, etc.  ·  Please notify your surgeon if you experience dizziness, shortness         of breath or blurred vision between now and the time of your surgery. Do not shave your operative site 96 hours prior to surgery. For face and neck surgery, men may use an electric razor 48 hours   prior to surgery. You may shower the night before surgery or the morning of   your surgery with an antibacterial soap. You will need to bring a photo ID and insurance card    [facility] has an onsite pharmacy, would you like to utilize our pharmacy     If you will be staying overnight and use a C-pap machine, please bring   your C-pap to hospital     Our goal is to provide you with excellent care, therefore, visitors will be limited to two(2) in the room at a time so that we may focus on providing this care for you. Please contact pre-admission testing if you have any further questions. [facility] phone number:  [phone]  Please note these are generalized instructions for all surgical cases, you may be provided with more specific instructions according to your surgery.

## 2021-09-28 ENCOUNTER — TELEPHONE (OUTPATIENT)
Dept: CARDIOLOGY CLINIC | Age: 71
End: 2021-09-28

## 2021-09-28 NOTE — TELEPHONE ENCOUNTER
Dr. Tri Caldwell,     Please see below requests and advise. Last office visit 7/2021 CMP/chronic systolic CHF, AFIB, HTN.

## 2021-09-28 NOTE — LETTER
[address] [facility]  Phone: [phone]  Fax: [phone]    Clara Roberts MD        September 29, 2021     Patient: Logan Martinez   YOB: 1950   Date of Visit: 9/28/2021       To Whom It May Concern: It is my medical opinion that Tamra Jon , May stop Eliquis 2 days before and then resume as soon as it is safe. If you have any questions or concerns, please don't hesitate to call.     Sincerely,        Clara Roberts MD

## 2021-09-30 ENCOUNTER — ANESTHESIA EVENT (OUTPATIENT)
Dept: ENDOSCOPY | Age: 71
End: 2021-09-30
Payer: MEDICARE

## 2021-10-04 ENCOUNTER — ANESTHESIA (OUTPATIENT)
Dept: ENDOSCOPY | Age: 71
End: 2021-10-04
Payer: MEDICARE

## 2021-10-04 ENCOUNTER — HOSPITAL ENCOUNTER (OUTPATIENT)
Age: 71
Setting detail: OUTPATIENT SURGERY
Discharge: HOME | End: 2021-10-04
Attending: INTERNAL MEDICINE | Admitting: INTERNAL MEDICINE
Payer: MEDICARE

## 2021-10-04 VITALS
SYSTOLIC BLOOD PRESSURE: 107 MMHG | TEMPERATURE: 96.9 F | OXYGEN SATURATION: 98 % | DIASTOLIC BLOOD PRESSURE: 70 MMHG | RESPIRATION RATE: 1 BRPM | BODY MASS INDEX: 29.21 KG/M2 | WEIGHT: 247.4 LBS | HEIGHT: 77 IN | HEART RATE: 53 BPM

## 2021-10-04 VITALS
SYSTOLIC BLOOD PRESSURE: 111 MMHG | TEMPERATURE: 97 F | DIASTOLIC BLOOD PRESSURE: 72 MMHG | OXYGEN SATURATION: 97 % | RESPIRATION RATE: 14 BRPM

## 2021-10-04 DIAGNOSIS — Z12.11 SCREEN FOR COLON CANCER: ICD-10-CM

## 2021-10-04 PROCEDURE — 2500000003 HC RX 250 WO HCPCS

## 2021-10-04 PROCEDURE — 6360000002 HC RX W HCPCS

## 2021-10-04 PROCEDURE — 3609010300 HC COLONOSCOPY W/BIOPSY SINGLE/MULTIPLE: Performed by: INTERNAL MEDICINE

## 2021-10-04 PROCEDURE — 3700000000 HC ANESTHESIA ATTENDED CARE: Performed by: INTERNAL MEDICINE

## 2021-10-04 PROCEDURE — 2709999900 HC NON-CHARGEABLE SUPPLY: Performed by: INTERNAL MEDICINE

## 2021-10-04 PROCEDURE — 7100000010 HC PHASE II RECOVERY - FIRST 15 MIN: Performed by: INTERNAL MEDICINE

## 2021-10-04 PROCEDURE — 7100000011 HC PHASE II RECOVERY - ADDTL 15 MIN: Performed by: INTERNAL MEDICINE

## 2021-10-04 PROCEDURE — 2580000003 HC RX 258: Performed by: STUDENT IN AN ORGANIZED HEALTH CARE EDUCATION/TRAINING PROGRAM

## 2021-10-04 PROCEDURE — 3700000001 HC ADD 15 MINUTES (ANESTHESIA): Performed by: INTERNAL MEDICINE

## 2021-10-04 PROCEDURE — 88305 TISSUE EXAM BY PATHOLOGIST: CPT

## 2021-10-04 RX ORDER — GLYCOPYRROLATE 0.2 MG/ML
INJECTION INTRAMUSCULAR; INTRAVENOUS PRN
Status: DISCONTINUED | OUTPATIENT
Start: 2021-10-04 | End: 2021-10-04 | Stop reason: SDUPTHER

## 2021-10-04 RX ORDER — SODIUM CHLORIDE 9 MG/ML
25 INJECTION, SOLUTION INTRAVENOUS PRN
Status: DISCONTINUED | OUTPATIENT
Start: 2021-10-04 | End: 2021-10-04 | Stop reason: HOSPADM

## 2021-10-04 RX ORDER — ONDANSETRON 2 MG/ML
4 INJECTION INTRAMUSCULAR; INTRAVENOUS
Status: DISCONTINUED | OUTPATIENT
Start: 2021-10-04 | End: 2021-10-04 | Stop reason: HOSPADM

## 2021-10-04 RX ORDER — SODIUM CHLORIDE 9 MG/ML
INJECTION, SOLUTION INTRAVENOUS CONTINUOUS
Status: DISCONTINUED | OUTPATIENT
Start: 2021-10-04 | End: 2021-10-04 | Stop reason: HOSPADM

## 2021-10-04 RX ORDER — SODIUM CHLORIDE 0.9 % (FLUSH) 0.9 %
5-40 SYRINGE (ML) INJECTION PRN
Status: DISCONTINUED | OUTPATIENT
Start: 2021-10-04 | End: 2021-10-04 | Stop reason: HOSPADM

## 2021-10-04 RX ORDER — SODIUM CHLORIDE 0.9 % (FLUSH) 0.9 %
5-40 SYRINGE (ML) INJECTION EVERY 12 HOURS SCHEDULED
Status: DISCONTINUED | OUTPATIENT
Start: 2021-10-04 | End: 2021-10-04 | Stop reason: HOSPADM

## 2021-10-04 RX ORDER — PROPOFOL 10 MG/ML
INJECTION, EMULSION INTRAVENOUS PRN
Status: DISCONTINUED | OUTPATIENT
Start: 2021-10-04 | End: 2021-10-04 | Stop reason: SDUPTHER

## 2021-10-04 RX ORDER — LIDOCAINE HYDROCHLORIDE 20 MG/ML
INJECTION, SOLUTION EPIDURAL; INFILTRATION; INTRACAUDAL; PERINEURAL PRN
Status: DISCONTINUED | OUTPATIENT
Start: 2021-10-04 | End: 2021-10-04 | Stop reason: SDUPTHER

## 2021-10-04 RX ADMIN — SODIUM CHLORIDE: 9 INJECTION, SOLUTION INTRAVENOUS at 09:48

## 2021-10-04 RX ADMIN — PROPOFOL 40 MG: 10 INJECTION, EMULSION INTRAVENOUS at 10:04

## 2021-10-04 RX ADMIN — PROPOFOL 40 MG: 10 INJECTION, EMULSION INTRAVENOUS at 10:09

## 2021-10-04 RX ADMIN — GLYCOPYRROLATE 0.2 MG: 0.2 INJECTION, SOLUTION INTRAMUSCULAR; INTRAVENOUS at 09:50

## 2021-10-04 RX ADMIN — PROPOFOL 80 MG: 10 INJECTION, EMULSION INTRAVENOUS at 09:57

## 2021-10-04 RX ADMIN — LIDOCAINE HYDROCHLORIDE 100 MG: 20 INJECTION, SOLUTION EPIDURAL; INFILTRATION; INTRACAUDAL; PERINEURAL at 09:57

## 2021-10-04 ASSESSMENT — LIFESTYLE VARIABLES: SMOKING_STATUS: 0

## 2021-10-04 ASSESSMENT — PAIN SCALES - GENERAL
PAINLEVEL_OUTOF10: 0
PAINLEVEL_OUTOF10: 0

## 2021-10-04 ASSESSMENT — PAIN - FUNCTIONAL ASSESSMENT: PAIN_FUNCTIONAL_ASSESSMENT: 0-10

## 2021-10-04 NOTE — ANESTHESIA PRE PROCEDURE
Department of Anesthesiology  Preprocedure Note       Name:  Rekha Brown   Age:  80 y.o.  :  1950                                          MRN:  [id number]         Date:  10/4/2021      Surgeon: Don Rodriguez):  Mira Gutierrez MD    Procedure: Procedure(s):  COLORECTAL CANCER SCREENING, NOT HIGH RISK    Medications prior to admission:   Prior to Admission medications    Medication Sig Start Date End Date Taking? Authorizing Provider   spironolactone (ALDACTONE) 25 MG tablet TAKE 1 TABLET BY MOUTH DAILY 21  Yes Maida Bee MD   chlorthalidone (HYGROTON) 25 MG tablet TAKE 1 TABLET BY MOUTH DAILY 21  Yes Maida Bee MD   atenolol (TENORMIN) 50 MG tablet TAKE 1 TABLET BY MOUTH DAILY 21  Yes Maida Bee MD   valsartan (DIOVAN) 160 MG tablet Take 1 tablet by mouth daily 21  Yes Maida Bee MD   ELIQUIS 5 MG TABS tablet TAKE 1 TABLET BY MOUTH TWICE DAILY  Patient taking differently: Indications: need to be off 48 hours prior to procedure on 10/4/21  7/6/21  Yes Andrei Tovar MD   amiodarone (CORDARONE) 200 MG tablet TAKE 1 TABLET BY MOUTH TWICE DAILY 21  Yes Andrei Tovar MD       Current medications:    Current Facility-Administered Medications   Medication Dose Route Frequency Provider Last Rate Last Admin    0.9 % sodium chloride infusion   IntraVENous Continuous Ángel Escobar MD        sodium chloride flush 0.9 % injection 5-40 mL  5-40 mL IntraVENous 2 times per day Ángel Escobar MD        sodium chloride flush 0.9 % injection 5-40 mL  5-40 mL IntraVENous PRN Ángel Escobar MD        0.9 % sodium chloride infusion  25 mL IntraVENous PRN Ángel Escobar MD           Allergies:     Allergies   Allergen Reactions    Lisinopril Other (See Comments)     DRY COUGH       Problem List:    Patient Active Problem List   Diagnosis Code    Atrial fibrillation with RVR ([facility]) L03.50    Acute systolic heart failure ([facility]) I50.21    Chest pain R07.9       Past Medical History:        Diagnosis Date    Atrial fibrillation ([facility] [address] 08/2021    Dr. Lowell Jarrell CKD (chronic kidney disease)     ckd-stg 3    Dysphagia     Hypertension        Past Surgical History:        Procedure Laterality Date    COLONOSCOPY         Social History:    Social History     Tobacco Use    Smoking status: Never Smoker    Smokeless tobacco: Never Used   Substance Use Topics    Alcohol use: Not Currently                                Counseling given: Not Answered      Vital Signs (Current):   Vitals:    09/27/21 1532 10/04/21 0939   BP:  (!) 150/82   Pulse:  62   Resp:  16   Temp:  97 °F (36.1 °C)   TempSrc:  Temporal   SpO2:  97%   Weight: 250 lb (113.4 kg) 247 lb 6.4 oz (112.2 kg)   Height: 6' 5\" (1.956 m) 6' 5\" (1.956 m)                                              BP Readings from Last 3 Encounters:   10/04/21 (!) 150/82   07/29/21 126/72   06/17/21 105/61       NPO Status: Time of last liquid consumption: 0330                        Time of last solid consumption: 1900                        Date of last liquid consumption: 10/04/21                        Date of last solid food consumption: 10/02/21    BMI:   Wt Readings from Last 3 Encounters:   10/04/21 247 lb 6.4 oz (112.2 kg)   07/29/21 250 lb (113.4 kg)   06/17/21 250 lb (113.4 kg)     Body mass index is 29.34 kg/m².     CBC:   Lab Results   Component Value Date    WBC 7.1 06/21/2021    RBC 4.81 06/21/2021    HGB 14.6 06/21/2021    HCT 42.2 06/21/2021    MCV 87.6 06/21/2021    RDW 14.1 06/21/2021     06/21/2021       CMP:   Lab Results   Component Value Date     08/13/2021    K 4.4 08/13/2021    K 4.2 06/16/2021     08/13/2021    CO2 25 08/13/2021    BUN 30 08/13/2021    CREATININE 1.4 08/13/2021    GFRAA >60 08/13/2021    AGRATIO 1.4 07/29/2021    LABGLOM 50 08/13/2021    GLUCOSE 104 08/13/2021    PROT 7.6 07/29/2021    CALCIUM 8.8 08/13/2021    BILITOT 0.6 07/29/2021    ALKPHOS 88 07/29/2021    AST 18 07/29/2021    ALT 20 07/29/2021       POC Tests: No results for input(s): POCGLU, POCNA, POCK, POCCL, POCBUN, POCHEMO, POCHCT in the last 72 hours. Coags:   Lab Results   Component Value Date    PROTIME 12.8 04/12/2021    INR 1.10 04/12/2021    APTT 32.2 04/12/2021       HCG (If Applicable): No results found for: PREGTESTUR, PREGSERUM, HCG, HCGQUANT     ABGs: No results found for: PHART, PO2ART, YLH2NLZ, BEU5ULV, BEART, H2AWNZKY     Type & Screen (If Applicable):  No results found for: LABABO, LABRH    Drug/Infectious Status (If Applicable):  No results found for: HIV, HEPCAB    COVID-19 Screening (If Applicable):   Lab Results   Component Value Date    COVID19 Not Detected 04/12/2021           Anesthesia Evaluation   no history of anesthetic complications:   Airway: Mallampati: III  TM distance: >3 FB     Mouth opening: > = 3 FB Dental:      Comment: Multiple molars chipped/missing. No loose teeth. Pulmonary: breath sounds clear to auscultation      (-) COPD, asthma, sleep apnea, rhonchi, wheezes, rales and not a current smoker                           Cardiovascular:  Exercise tolerance: good (>4 METS),   (+) hypertension:, dysrhythmias (amiodarone): atrial fibrillation, CHF (acute 7/29/2021 in setting of a-fib with RVR):,     (-) weak pulses and peripheral edema      Rhythm: regular  Rate: normal           Beta Blocker:  Dose within 24 Hrs      ROS comment: Echocardiogram (8/27/2021):  Summary   Normal left ventricle size, wall thickness, and systolic function with an estimated ejection fraction of 55-60%. No regional wall motion abnormalities are seen. The left atrium is mildly dilated. The aortic valve is structurally normal.     Neuro/Psych:      (-) seizures, TIA and CVA           GI/Hepatic/Renal:   (+) renal disease (CKD-3): CRI,      (-) liver disease and no morbid obesity      ROS comment: Dysphagia.    Endo/Other:    (+) blood dyscrasia (Eliquis): anticoagulation therapy:., .    (-) diabetes mellitus,

## 2021-10-04 NOTE — PROCEDURES
[facility]  Endoscopy Note    Patient: Adriano Miranda  : 1950  Acct#: [de-identified]    Procedure: Colonoscopy with biopsy    Date:  10/4/2021    Surgeon:  Daisy Edge MD, MD    Referring Physician:  Gloria Arreaga    Previous Colonoscopy: Yes  Date: unknown  Greater than 3 years? Yes    Preoperative Diagnosis:  screening    Postoperative Diagnosis:  Colon polyp    Anesthesia:  See anesthesia note    Indications: This is a 80y.o. year old male who presents today with screening for colon cancer. Procedure: An informed consent was obtained from the patient after explanation of indications, benefits, possible risks and complications of the procedure. The patient was then taken to the endoscopy suite, placed in the left lateral decubitus position, and the above IV anesthesia was administered. A digital rectal examination was performed and revealed negative without mass, lesions or tenderness. The Olympus CFQ-180-AL video colonoscope was placed in the patient's rectum under digital direction and advanced to the cecum. The cecum was identified by characteristic anatomy and ballottment. The ileocecal valve was identified. The preparation was good. The scope was then withdrawn back through the cecum, ascending, transverse, descending and sigmoid colons. Carefull circumferential examination of the mucosa in these areas demonstrated a 3 mm polyp in the ascending colon that was biopsied and removed. The scope was then withdrawn into the rectum and retroflexed. The retroflexed view of the anal verge and rectum demonstrates no abnormalities. The scope was straightened, the colon was decompressed and the scope was withdrawn from the patient. The patient tolerated the procedure well and was taken to the PACU in good condition. Estimated Blood Loss:  Minimal.    Impression: Colon polyp    Recommendations:  Await pathology. Repeat colonoscopy in 5 years.     Daisy Edge MD, MD [facility]  10/4/2021

## 2021-10-04 NOTE — ANESTHESIA POSTPROCEDURE EVALUATION
Department of Anesthesiology  Postprocedure Note    Patient: Lola Garay  MRN: [id number]  YOB: 1950  Date of evaluation: 10/4/2021  Time:  10:35 AM     Procedure Summary     Date: 10/04/21 Room / Location: [address] [place] [address]    Anesthesia Start: 7696 Anesthesia Stop: 2256    Procedure: COLONOSCOPY WITH BIOPSY (N/A ) Diagnosis:       Screen for colon cancer      (Screen for colon cancer)    Surgeons: Yoav Loaiza MD Responsible Provider: Trini Rasmey MD    Anesthesia Type: MAC ASA Status: 3          Anesthesia Type: MAC    Gianna Phase I: Gianna Score: 10    Gianna Phase II: Gianna Score: 10    Last vitals: Reviewed and per EMR flowsheets. Anesthesia Post Evaluation    Patient location during evaluation: PACU  Patient participation: complete - patient participated  Level of consciousness: awake and alert  Airway patency: patent  Nausea & Vomiting: no nausea and no vomiting  Complications: no  Cardiovascular status: hemodynamically stable and blood pressure returned to baseline  Respiratory status: spontaneous ventilation, nonlabored ventilation and room air  Hydration status: stable  Comments: Mr. David Man resting comfortably following procedure. Denies any significant pain or nausea. Appropriate for discharge home with . Advised to avoid driving, major decisions, or signing contracts for remaining of day. (Patient is a Realtor). No restrictions from an anesthesia perspective to travel 10/6/2021.       Trini Ramsey MD

## 2021-10-04 NOTE — H&P
[facility]   Pre-operative History and Physical    Patient: Christopher Fajardo  : 1950  Acct#: [de-identified]    History Obtained From: electronic medical record    HISTORY OF PRESENT ILLNESS  Procedure:Colonoscopy  Indications:screening  Past Medical History:        Diagnosis Date    Atrial fibrillation ([facility] [address] 2021    Dr. Lilian Conrad CKD (chronic kidney disease)     ckd-stg 3    Dysphagia     Hypertension      Past Surgical History:        Procedure Laterality Date    COLONOSCOPY       Medications prior to admission:   Prior to Admission medications    Medication Sig Start Date End Date Taking? Authorizing Provider   spironolactone (ALDACTONE) 25 MG tablet TAKE 1 TABLET BY MOUTH DAILY 21  Yes Bam Cooley MD   chlorthalidone (HYGROTON) 25 MG tablet TAKE 1 TABLET BY MOUTH DAILY 21  Yes Bam Cooley MD   atenolol (TENORMIN) 50 MG tablet TAKE 1 TABLET BY MOUTH DAILY 21  Yes Bam Cooley MD   valsartan (DIOVAN) 160 MG tablet Take 1 tablet by mouth daily 21  Yes Bam Cooley MD   ELIQUIS 5 MG TABS tablet TAKE 1 TABLET BY MOUTH TWICE DAILY  Patient taking differently: Indications: need to be off 48 hours prior to procedure on 10/4/21  7/6/21  Yes Keith Redding MD   amiodarone (CORDARONE) 200 MG tablet TAKE 1 TABLET BY MOUTH TWICE DAILY 21  Yes Keith Redding MD     Allergies:   Lisinopril    Social History     Socioeconomic History    Marital status:       Spouse name: Not on file    Number of children: Not on file    Years of education: Not on file    Highest education level: Not on file   Occupational History    Not on file   Tobacco Use    Smoking status: Never Smoker    Smokeless tobacco: Never Used   Vaping Use    Vaping Use: Never used   Substance and Sexual Activity    Alcohol use: Not Currently    Drug use: Never    Sexual activity: Not Currently   Other Topics Concern    Not on file   Social History Narrative    Not on file     Social Determinants of Health     Financial Resource Strain:     Difficulty of Paying Living Expenses:    Food Insecurity:     Worried About [address] in the Last Year:     [address] in the Last Year:    Transportation Needs:     Lack of Transportation (Medical):  Lack of Transportation (Non-Medical):    Physical Activity:     Days of Exercise per Week:     Minutes of Exercise per Session:    Stress:     Feeling of Stress :    Social Connections:     Frequency of Communication with Friends and Family:     Frequency of Social Gatherings with Friends and Family:     Attends Sabianist Services:     Active Member of Clubs or Organizations:     Attends Club or Organization Meetings:     Marital Status:    Intimate Partner Violence:     Fear of Current or Ex-Partner:     Emotionally Abused:     Physically Abused:     Sexually Abused:      Family History   Problem Relation Age of Onset    Cancer Mother         lung    Stroke Father     Cancer Brother         nonhodgkin lymphoma         PHYSICAL EXAM:      Ht 6' 5\" (1.956 m)   Wt 250 lb (113.4 kg)   BMI 29.65 kg/m²  I        Heart:normal    Lungs: normal    Abdomen: normal      ASA Grade:  See anesthesia note      ASSESSMENT AND PLAN:    1. Procedure options, risks and benefits reviewed with patient and expresses understanding.

## 2021-12-03 RX ORDER — CHLORTHALIDONE 25 MG/1
25 TABLET ORAL DAILY
Qty: 60 TABLET | Refills: 0 | Status: SHIPPED | OUTPATIENT
Start: 2021-12-03 | End: 2022-02-02

## 2021-12-03 RX ORDER — SPIRONOLACTONE 25 MG/1
25 TABLET ORAL DAILY
Qty: 60 TABLET | Refills: 0 | Status: SHIPPED | OUTPATIENT
Start: 2021-12-03 | End: 2022-02-02

## 2021-12-03 RX ORDER — ATENOLOL 50 MG/1
50 TABLET ORAL DAILY
Qty: 60 TABLET | Refills: 0 | Status: SHIPPED | OUTPATIENT
Start: 2021-12-03 | End: 2022-02-02

## 2021-12-03 NOTE — TELEPHONE ENCOUNTER
Last OV: 7/29/21  Next OV: 6mth f/u 1/2022  Last refill:9/1/21  Most recent Labs: 8/13/21  Last EKG (if needed):7/29/21

## 2022-01-19 NOTE — TELEPHONE ENCOUNTER
Last ov 7/29/21  Pending appt due soon  Last refill both meds 7/6/21 #60x5  Last labs 8/13/21    [phone] (home)   Left message on pts machine to call back  Pt will need appt soon

## 2022-01-20 RX ORDER — AMIODARONE HYDROCHLORIDE 200 MG/1
TABLET ORAL
Qty: 60 TABLET | Refills: 0 | Status: SHIPPED | OUTPATIENT
Start: 2022-01-20 | End: 2022-02-18

## 2022-01-20 RX ORDER — APIXABAN 5 MG/1
TABLET, FILM COATED ORAL
Qty: 60 TABLET | Refills: 0 | Status: SHIPPED | OUTPATIENT
Start: 2022-01-20 | End: 2022-02-18

## 2022-02-02 RX ORDER — CHLORTHALIDONE 25 MG/1
25 TABLET ORAL DAILY
Qty: 60 TABLET | Refills: 0 | Status: SHIPPED | OUTPATIENT
Start: 2022-02-02 | End: 2022-04-05

## 2022-02-02 RX ORDER — SPIRONOLACTONE 25 MG/1
25 TABLET ORAL DAILY
Qty: 60 TABLET | Refills: 0 | Status: SHIPPED | OUTPATIENT
Start: 2022-02-02 | End: 2022-04-05

## 2022-02-02 RX ORDER — ATENOLOL 50 MG/1
50 TABLET ORAL DAILY
Qty: 60 TABLET | Refills: 0 | Status: SHIPPED | OUTPATIENT
Start: 2022-02-02 | End: 2022-04-05

## 2022-02-02 NOTE — TELEPHONE ENCOUNTER
Last OV: 07/29/2021  Next OV: 02/03/2022  Last refill:  Most recent Labs: bmp 08/13/2021  Last EKG (if needed):07/29/2021

## 2022-02-18 RX ORDER — APIXABAN 5 MG/1
TABLET, FILM COATED ORAL
Qty: 180 TABLET | Refills: 1 | Status: SHIPPED | OUTPATIENT
Start: 2022-02-18 | End: 2022-08-15

## 2022-02-18 RX ORDER — AMIODARONE HYDROCHLORIDE 200 MG/1
TABLET ORAL
Qty: 180 TABLET | Refills: 1 | Status: SHIPPED | OUTPATIENT
Start: 2022-02-18 | End: 2022-03-03

## 2022-02-28 NOTE — PROGRESS NOTES
Lymphadenopathy  Respiratory:  Resp Assessment: normal, Resp Auscultation: clear  Cardiovascular: Auscultation: nl S1 & S2, Palpation:  Nl PMI; No heaves or thrills, JVP:  normal  Abdomen: Soft, non-tender, Normal bowel sounds,  No organomegaly  Extremities: No Cyanosis or Clubbing  Neurological: Oriented to time, place, and person, Non-anxious  Psychiatric: Normal mood and affect  Skin: Warm and dry,  No rash seen     No outpatient medications have been marked as taking for the 3/3/22 encounter (Appointment) with Simone Tinsley MD.         Labs:   Lab Results   Component Value Date    HDL 35 2021    [address] 93 2021    TRIG 90 2021         EK21, sinus bradycardia rate 59  21, sinus bradycardia, rate 57    Chest X-Ray: 2021  Impression   Bibasilar airspace disease most consistent with pneumonia.      ECHO: 2021  Suboptimal image quality. Normal left ventricular size and wall thickness. LVEF is difficult to assess  due to arrhythmia but appears mildly reduced with global systolic  dysfunction.  LVEF 45-50%.  Unable to exclude regional wall motion abnormalities. Indeterminate diastolic function due to atrial fibrillation. The right ventricle is normal in size and function. Mild mitral regurgitation is present.     DCCV: 21  Successful external DC cardioversion of atrial fibrillation. Stress test: 21      Summary    Normal stress myocardial perfusion.    Overall findings represent a low risk scan.        Recommendation    risk factor modification    medical therapy     Limited Echo: 21  Normal left ventricle size, wall thickness, and systolic function with an  estimated ejection fraction of 55-60%. No regional wall motion abnormalities are seen. The left atrium is mildly dilated. The aortic valve is structurally normal.    ASSESSMENT AND PLAN:    Resolved cardiomyopathy/heart failure   NYHA Class II  EF is 45 to 50% (2021).  Improved to 55-60% by Echo 8/2021  Weight and breathing stable   Continue GDMT; chlorthalidone, atenolol and aldactone   Has a dry cough and therefore will discontinue lisinopril and start Valsartan 160 mg daily   Appears compensated on today's exam  Reviewed CMP completed on 7/29/21; Creatinine 1.8    Atrial fibrillation  Paroxysmal   s/p JAMIA/Cardioversion on 4/14/21  On Amiodarone and Atenolol  CHADS2 score is 3 (age, CHF, hypertension); Eliquis for thromboembolic risk reduction      Hypertension  BP goal <130/80      Personally reviewed testing and labs. Follow up in 6 months        Thank you very much for allowing me to participate in the care of your patient. Please do not hesitate to contact me if you have any questions.       Sincerely,    Robbie Mendoza M.D  [facility], [address] [place] [address]  Ph: [phone]  Fax: [phone]    This note was scribed in the presence of Dr. Robbie Mendoza MD by Gladys Anderson  **

## 2022-03-03 ENCOUNTER — OFFICE VISIT (OUTPATIENT)
Dept: CARDIOLOGY CLINIC | Age: 72
End: 2022-03-03
Payer: MEDICARE

## 2022-03-03 VITALS
HEIGHT: 77 IN | OXYGEN SATURATION: 100 % | DIASTOLIC BLOOD PRESSURE: 76 MMHG | WEIGHT: 247.2 LBS | BODY MASS INDEX: 29.19 KG/M2 | SYSTOLIC BLOOD PRESSURE: 118 MMHG | HEART RATE: 53 BPM

## 2022-03-03 DIAGNOSIS — I42.8 NON-ISCHEMIC CARDIOMYOPATHY (HCC): ICD-10-CM

## 2022-03-03 DIAGNOSIS — I48.0 PAF (PAROXYSMAL ATRIAL FIBRILLATION) (HCC): ICD-10-CM

## 2022-03-03 DIAGNOSIS — I48.0 PAF (PAROXYSMAL ATRIAL FIBRILLATION) (HCC): Primary | ICD-10-CM

## 2022-03-03 DIAGNOSIS — I10 ESSENTIAL HYPERTENSION: ICD-10-CM

## 2022-03-03 LAB
A/G RATIO: 1.6 (ref 1.1–2.2)
ALBUMIN SERPL-MCNC: 4.5 G/DL (ref 3.4–5)
ALP BLD-CCNC: 90 U/L (ref 40–129)
ALT SERPL-CCNC: 25 U/L (ref 10–40)
ANION GAP SERPL CALCULATED.3IONS-SCNC: 15 MMOL/L (ref 3–16)
AST SERPL-CCNC: 21 U/L (ref 15–37)
BILIRUB SERPL-MCNC: 0.5 MG/DL (ref 0–1)
BUN BLDV-MCNC: 25 MG/DL (ref 7–20)
CALCIUM SERPL-MCNC: 9.6 MG/DL (ref 8.3–10.6)
CHLORIDE BLD-SCNC: 98 MMOL/L (ref 99–110)
CO2: 25 MMOL/L (ref 21–32)
CREAT SERPL-MCNC: 1.8 MG/DL (ref 0.8–1.3)
GFR AFRICAN AMERICAN: 45
GFR NON-AFRICAN AMERICAN: 37
GLUCOSE BLD-MCNC: 78 MG/DL (ref 70–99)
POTASSIUM SERPL-SCNC: 4.9 MMOL/L (ref 3.5–5.1)
SODIUM BLD-SCNC: 138 MMOL/L (ref 136–145)
TOTAL PROTEIN: 7.3 G/DL (ref 6.4–8.2)
TSH REFLEX: 1.66 UIU/ML (ref 0.27–4.2)

## 2022-03-03 PROCEDURE — 4040F PNEUMOC VAC/ADMIN/RCVD: CPT | Performed by: INTERNAL MEDICINE

## 2022-03-03 PROCEDURE — 1123F ACP DISCUSS/DSCN MKR DOCD: CPT | Performed by: INTERNAL MEDICINE

## 2022-03-03 PROCEDURE — G8417 CALC BMI ABV UP PARAM F/U: HCPCS | Performed by: INTERNAL MEDICINE

## 2022-03-03 PROCEDURE — G8427 DOCREV CUR MEDS BY ELIG CLIN: HCPCS | Performed by: INTERNAL MEDICINE

## 2022-03-03 PROCEDURE — 99214 OFFICE O/P EST MOD 30 MIN: CPT | Performed by: INTERNAL MEDICINE

## 2022-03-03 PROCEDURE — 1036F TOBACCO NON-USER: CPT | Performed by: INTERNAL MEDICINE

## 2022-03-03 PROCEDURE — G8484 FLU IMMUNIZE NO ADMIN: HCPCS | Performed by: INTERNAL MEDICINE

## 2022-03-03 PROCEDURE — 93000 ELECTROCARDIOGRAM COMPLETE: CPT | Performed by: INTERNAL MEDICINE

## 2022-03-03 PROCEDURE — 3017F COLORECTAL CA SCREEN DOC REV: CPT | Performed by: INTERNAL MEDICINE

## 2022-03-03 RX ORDER — AMIODARONE HYDROCHLORIDE 200 MG/1
TABLET ORAL
Qty: 75 TABLET | Refills: 3 | Status: SHIPPED | OUTPATIENT
Start: 2022-03-03 | End: 2022-08-19

## 2022-03-03 NOTE — PROGRESS NOTES
[address]  Cardiology  Note      Markel Turpin  1950, 80 y.o. KINGSLEY PHILIPPE GLENNAT:    CC:  \"Feeling fine. \"     HPI:   This is a 80 y.o. male with no documented PMH who presented to [facility] with progressively worsening shortness of breath for several days associated with orthopnea and exertion. EKG indicated AF RVR . CXR showed white density - pulm edema vs Pna? BNP elevated 4011. ECHO with mod low EF 45-50%. He was started on cardizem gtt and lasix. Underwent JAMIA cardioversion with Dr. Bernardo Butts. Successfully converted to SR. Pedal/ankle edema noted. Good diuresis with IV Lasix. Returned to ED 6/2021 with c/o chest pain. Troponins were negative and EKG showed sinus rhythm with no ischemic changes. Stress test was negative for ischemia     Today, he states overall he is doing well. He denies any new cardiac complaints and states he continues to remain active without any exertional symptoms. He denies any chest pains or worsening shortness of breath. He reports compliance with his medications and tolerating. He denies any abnormal bleeding or bruising. Patient denies exertional chest pain/pressure, dyspnea at rest, GOEL, PND, orthopnea, palpitations, lightheadedness, weight changes, changes in LE edema, and syncope. He continues to monitor his blood pressure and states it is typically controlled.      Social History     Tobacco Use    Smoking status: Never Smoker    Smokeless tobacco: Never Used   Vaping Use    Vaping Use: Never used   Substance Use Topics    Alcohol use: Not Currently    Drug use: Never     Allergies   Allergen Reactions    Lisinopril Other (See Comments)     DRY COUGH     Review of Systems -   Constitutional: Negative for weight gain/loss; malaise, fever  Respiratory: Negative for Asthma;  cough and hemoptysis  Cardiovascular: Negative for palpitations,dizziness   Gastrointestinal: Negative for abd.pain; constipation/diarrhea;    Genitourinary: Negative for stones; hematuria; frequency hesitancy  Integumentt: Negative for rash or pruritis  Hematologic/lymphatic: Negative for blood dyscrasia; leukemia/lymphoma  Musculoskeletal: Negative for Connective tissue disease  Neurological:  Negative for Seizure   Behavioral/Psych:Negative for Bipolar disorder, Schizophrenia; Dementia  Endocrine: negative for thyroid, parathyroid disease    Physical Examination:    /76   Pulse 53   Ht 6' 5\" (1.956 m)   Wt 247 lb 3.2 oz (112.1 kg)   SpO2 100%   BMI 29.31 kg/m²      HEENT:  Face: Atraumatic, Conjunctiva: Pink; non icteric,  Mucous Memb:  Moist, No thyromegaly or Lymphadenopathy  Respiratory:  Resp Assessment: normal, Resp Auscultation: clear  Cardiovascular: Auscultation: nl S1 & S2, Palpation:  Nl PMI; No heaves or thrills, JVP:  normal  Abdomen: Soft, non-tender, Normal bowel sounds,  No organomegaly  Extremities: No Cyanosis or Clubbing  Neurological: Oriented to time, place, and person, Non-anxious  Psychiatric: Normal mood and affect  Skin: Warm and dry,  No rash seen     Outpatient Medications Marked as Taking for the 3/3/22 encounter (Office Visit) with Joann Cardozo MD   Medication Sig Dispense Refill    amiodarone (CORDARONE) 200 MG tablet Take one tablet 5 days a week. 75 tablet 3    ELIQUIS 5 MG TABS tablet TAKE 1 TABLET BY MOUTH TWICE DAILY 180 tablet 1    chlorthalidone (HYGROTON) 25 MG tablet TAKE 1 TABLET BY MOUTH DAILY 60 tablet 0    atenolol (TENORMIN) 50 MG tablet TAKE 1 TABLET BY MOUTH DAILY 60 tablet 0    spironolactone (ALDACTONE) 25 MG tablet TAKE 1 TABLET BY MOUTH DAILY 60 tablet 0    valsartan (DIOVAN) 160 MG tablet Take 1 tablet by mouth daily 90 tablet 3     Labs:   Lab Results   Component Value Date    HDL 35 2021    LDLCALC 93 2021    TRIG 90 2021     EK21, sinus bradycardia rate 59  21, sinus bradycardia, rate 57  3/3/22, sinus bradycardia, rate 53.      Chest X-Ray: 2021  Impression   Bibasilar airspace disease most consistent with pneumonia.      ECHO: 4/12/2021  Suboptimal image quality. Normal left ventricular size and wall thickness. LVEF is difficult to assess due to arrhythmia but appears mildly reduced with global systolic dysfunction. LVEF 45-50%. Unable to exclude regional wall motion abnormalities. Indeterminate diastolic function due to atrial fibrillation. The right ventricle is normal in size and function. Mild mitral regurgitation is present.     DCCV: 4/14/21  Successful external DC cardioversion of atrial fibrillation. Stress test: 6/16/21  Normal stress myocardial perfusion. Overall findings represent a low risk scan. ASSESSMENT AND PLAN:    Cardiomyopathy/heart failure   EF is 45 to 50% (2021)  Asymptomatic   Continue GDMT; chlorthalidone, atenolol and aldactone   Dry cough with taking lisinopril and tolerating Valsartan 160 mg daily   Appears compensated   NYHA Class II  Repeat CMP, TSH     Paroxsymal atrial fibrillation   s/p JAMIA/Cardioversion on 4/14/21  ECG today shows sinus, HR controlled   CHADS2 score is 3 (age, CHF, hypertension); Eliquis for thromboembolic risk reduction   Given history of LV dysfunction and CHF, recurrence of A. fib is hazardous  Continue rate control strategy with atenolol   Will reduce amiodarone to 200 mg 5 days a week      Essential hypertension  BP goal <130/80  Controlled   Continue current medical therapy     Follow up in 6 months     Thank you very much for allowing me to participate in the care of your patient. Please do not hesitate to contact me if you have any questions. Sincerely,    Melva Romero M.D  [facility], [address] [place] [address]  Ph: [phone]  Fax: [phone]      This note was scribed in the presence of Dr Claudette Dowd MD by Olivia Rueda RN.    Physician Attestation:  The scribes documentation has been prepared under my direction and personally reviewed by me in its entirety. I confirm that the note above accurately reflects all work, treatment, procedures, and medical decision making performed by me.

## 2022-03-04 DIAGNOSIS — I50.32 CHRONIC DIASTOLIC HEART FAILURE (HCC): ICD-10-CM

## 2022-03-04 DIAGNOSIS — R79.89 CREATININE ELEVATION: Primary | ICD-10-CM

## 2022-03-12 DIAGNOSIS — I50.32 CHRONIC DIASTOLIC HEART FAILURE (HCC): ICD-10-CM

## 2022-03-12 DIAGNOSIS — R79.89 CREATININE ELEVATION: ICD-10-CM

## 2022-03-12 LAB
ANION GAP SERPL CALCULATED.3IONS-SCNC: 13 MMOL/L (ref 3–16)
BUN BLDV-MCNC: 27 MG/DL (ref 7–20)
CALCIUM SERPL-MCNC: 9.5 MG/DL (ref 8.3–10.6)
CHLORIDE BLD-SCNC: 98 MMOL/L (ref 99–110)
CO2: 25 MMOL/L (ref 21–32)
CREAT SERPL-MCNC: 1.6 MG/DL (ref 0.8–1.3)
GFR AFRICAN AMERICAN: 52
GFR NON-AFRICAN AMERICAN: 43
GLUCOSE BLD-MCNC: 111 MG/DL (ref 70–99)
POTASSIUM SERPL-SCNC: 4.3 MMOL/L (ref 3.5–5.1)
SODIUM BLD-SCNC: 136 MMOL/L (ref 136–145)

## 2022-04-05 RX ORDER — ATENOLOL 50 MG/1
50 TABLET ORAL DAILY
Qty: 60 TABLET | Refills: 0 | Status: SHIPPED | OUTPATIENT
Start: 2022-04-05 | End: 2022-06-01

## 2022-04-05 RX ORDER — SPIRONOLACTONE 25 MG/1
25 TABLET ORAL DAILY
Qty: 60 TABLET | Refills: 0 | Status: SHIPPED | OUTPATIENT
Start: 2022-04-05 | End: 2022-06-01

## 2022-04-05 RX ORDER — CHLORTHALIDONE 25 MG/1
25 TABLET ORAL DAILY
Qty: 60 TABLET | Refills: 0 | Status: SHIPPED | OUTPATIENT
Start: 2022-04-05 | End: 2022-06-01

## 2022-04-05 NOTE — TELEPHONE ENCOUNTER
Last ov 3/3/22  Pending appt due in september  Last refill all meds 2/2/22 1 month NR  Last labs 3/3/22      My chart message sent to pt of needing an appt for september

## 2022-06-01 RX ORDER — CHLORTHALIDONE 25 MG/1
25 TABLET ORAL DAILY
Qty: 60 TABLET | Refills: 0 | Status: SHIPPED | OUTPATIENT
Start: 2022-06-01 | End: 2022-08-01

## 2022-06-01 RX ORDER — ATENOLOL 50 MG/1
50 TABLET ORAL DAILY
Qty: 60 TABLET | Refills: 0 | Status: SHIPPED | OUTPATIENT
Start: 2022-06-01 | End: 2022-08-01

## 2022-06-01 RX ORDER — SPIRONOLACTONE 25 MG/1
25 TABLET ORAL DAILY
Qty: 60 TABLET | Refills: 0 | Status: SHIPPED | OUTPATIENT
Start: 2022-06-01 | End: 2022-08-01

## 2022-06-01 RX ORDER — ATENOLOL 50 MG/1
50 TABLET ORAL DAILY
Qty: 90 TABLET | OUTPATIENT
Start: 2022-06-01

## 2022-06-01 RX ORDER — SPIRONOLACTONE 25 MG/1
25 TABLET ORAL DAILY
Qty: 90 TABLET | OUTPATIENT
Start: 2022-06-01

## 2022-06-01 NOTE — TELEPHONE ENCOUNTER
Last ov 3/3/22  Pending appt due in september  Last refill all meds 4/5/22 1 month NR  Last labs 3/12/22    My chart message sent to pt to remind him of needing an appt in september

## 2022-07-21 RX ORDER — VALSARTAN 160 MG/1
160 TABLET ORAL DAILY
Qty: 90 TABLET | Refills: 3 | Status: SHIPPED | OUTPATIENT
Start: 2022-07-21

## 2022-07-21 NOTE — TELEPHONE ENCOUNTER
Last OV: 3/3/22  Next OV: 9/23/22  Last refill:7/29/21  Most recent Labs: 3/12/22  Last EKG (if needed):3/3/22

## 2022-08-01 RX ORDER — CHLORTHALIDONE 25 MG/1
25 TABLET ORAL DAILY
Qty: 90 TABLET | Refills: 3 | Status: SHIPPED | OUTPATIENT
Start: 2022-08-01

## 2022-08-01 RX ORDER — SPIRONOLACTONE 25 MG/1
25 TABLET ORAL DAILY
Qty: 90 TABLET | Refills: 3 | Status: SHIPPED | OUTPATIENT
Start: 2022-08-01

## 2022-08-01 RX ORDER — ATENOLOL 50 MG/1
50 TABLET ORAL DAILY
Qty: 90 TABLET | Refills: 3 | Status: SHIPPED | OUTPATIENT
Start: 2022-08-01

## 2022-08-01 NOTE — TELEPHONE ENCOUNTER
Last OV:3/3/2022  Last Labs:3/12/2022  Last Refills:6/1/2022  Next Appt:Note Follow up in 6 months  Last EKG:  3/3/2022

## 2022-08-15 RX ORDER — APIXABAN 5 MG/1
TABLET, FILM COATED ORAL
Qty: 180 TABLET | Refills: 1 | Status: SHIPPED | OUTPATIENT
Start: 2022-08-15

## 2022-08-19 RX ORDER — AMIODARONE HYDROCHLORIDE 200 MG/1
TABLET ORAL
Qty: 180 TABLET | Refills: 2 | Status: SHIPPED | OUTPATIENT
Start: 2022-08-19 | End: 2022-09-23

## 2022-09-22 NOTE — PROGRESS NOTES
[address]  Cardiology  Note      Love Enter  1950, 71 y.o. KINGSLEY PHILIPPE JUSTO:    CC:  \"I am doing well. \"     HPI:   Mr. Adan Page is a 15-year-old gentleman who we originally saw for CHF due to atrial fibrillation. He has undergone JAMIA cardioversion. He is on amiodarone and Eliquis. His EF is 45%. He has also had a stress test which was negative for ischemia in 2021      Today, he is here for follow up. He has been walking on a regular basis without limitations. He continues to work. He is inquiring how long he will need to be on Eliquis. Patient denies exertional chest pain/pressure, dyspnea at rest, GOEL, PND, orthopnea, palpitations, lightheadedness, weight changes, changes in LE edema, and syncope. Patient reports compliance to his medications. Previous note  This is a 71 y.o. male with no documented PMH who presented to [facility] with progressively worsening shortness of breath for several days associated with orthopnea and exertion. EKG indicated AF RVR . CXR showed white density - pulm edema vs Pna? BNP elevated 4011. ECHO with mod low EF 45-50%. He was started on cardizem gtt and lasix. Underwent JAMIA cardioversion with Dr. Raad Diaz. Successfully converted to SR. Pedal/ankle edema noted. Good diuresis with IV Lasix. Returned to ED 6/2021 with c/o chest pain. Troponins were negative and EKG showed sinus rhythm with no ischemic changes.  Stress test was negative for ischemia          Social History     Tobacco Use    Smoking status: Never    Smokeless tobacco: Never   Vaping Use    Vaping Use: Never used   Substance Use Topics    Alcohol use: Not Currently    Drug use: Never     Allergies   Allergen Reactions    Lisinopril Other (See Comments)     DRY COUGH     Review of Systems -   Constitutional: Negative for weight gain/loss; malaise, fever  Respiratory: Negative for Asthma;  cough and hemoptysis  Cardiovascular: Negative for palpitations,dizziness   Gastrointestinal: Negative for abd.pain; constipation/diarrhea;    Genitourinary: Negative for stones; hematuria; frequency hesitancy  Integumentt: Negative for rash or pruritis  Hematologic/lymphatic: Negative for blood dyscrasia; leukemia/lymphoma  Musculoskeletal: Negative for Connective tissue disease  Neurological:  Negative for Seizure   Behavioral/Psych:Negative for Bipolar disorder, Schizophrenia; Dementia  Endocrine: negative for thyroid, parathyroid disease    Physical Examination:    /80   Pulse 57   Ht 6' 5\" (1.956 m)   Wt 234 lb (106.1 kg)   SpO2 95%   BMI 27.75 kg/m²      HEENT:  Face: Atraumatic, Conjunctiva: Pink; non icteric,  Mucous Memb:  Moist, No thyromegaly or Lymphadenopathy  Respiratory:  Resp Assessment: normal, Resp Auscultation: clear  Cardiovascular: Auscultation: nl S1 & S2, Palpation:  Nl PMI;  No heaves or thrills, JVP:  normal  Abdomen: Soft, non-tender, Normal bowel sounds,  No organomegaly  Extremities: No Cyanosis or Clubbing  Neurological: Oriented to time, place, and person, Non-anxious  Psychiatric: Normal mood and affect  Skin: Warm and dry,  No rash seen     Outpatient Medications Marked as Taking for the 22 encounter (Office Visit) with Charissa Flores MD   Medication Sig Dispense Refill    amiodarone (CORDARONE) 200 MG tablet TAKE 1 TABLET BY MOUTH TWICE DAILY 180 tablet 2    ELIQUIS 5 MG TABS tablet TAKE 1 TABLET BY MOUTH TWICE DAILY 180 tablet 1    spironolactone (ALDACTONE) 25 MG tablet TAKE 1 TABLET BY MOUTH DAILY 90 tablet 3    chlorthalidone (HYGROTON) 25 MG tablet TAKE 1 TABLET BY MOUTH DAILY 90 tablet 3    atenolol (TENORMIN) 50 MG tablet TAKE 1 TABLET BY MOUTH DAILY 90 tablet 3    valsartan (DIOVAN) 160 MG tablet TAKE 1 TABLET BY MOUTH DAILY 90 tablet 3     Labs:   Lab Results   Component Value Date/Time    HDL 35 2021 05:10 AM    LDLCALC 93 2021 05:10 AM    TRIG 90 2021 05:10 AM     EK21, sinus bradycardia rate 59  21, sinus bradycardia, rate 57  3/3/22, sinus bradycardia, rate 53.   9/23/22, Sinus bradycardia    Chest X-Ray: 4/12/2021  Impression   Bibasilar airspace disease most consistent with pneumonia. ECHO: 4/12/2021  Suboptimal image quality. Normal left ventricular size and wall thickness. LVEF is difficult to assess due to arrhythmia but appears mildly reduced with global systolic dysfunction. LVEF 45-50%. Unable to exclude regional wall motion abnormalities. Indeterminate diastolic function due to atrial fibrillation. The right ventricle is normal in size and function. Mild mitral regurgitation is present. ECHO 8/27/2021  Normal left ventricle size, wall thickness, and systolic function with an  estimated ejection fraction of 55-60%. No regional wall motion abnormalities are seen. The left atrium is mildly dilated. The aortic valve is structurally normal.    DCCV: 4/14/21  Successful external DC cardioversion of atrial fibrillation. Stress test: 6/16/21  Normal stress myocardial perfusion. Overall findings represent a low risk scan. ASSESSMENT AND PLAN:    Cardiomyopathy/heart failure   EF is 45 to 50% (2021)  Continue GDMT; chlorthalidone, atenolol and aldactone valsartan  Appears compensated on exam.  Repeat echocardiogram to assess heart and valve function  NYHA Class II    Paroxsymal atrial fibrillation   s/p JAMIA/Cardioversion on 4/14/21; remains in sinus rhythm on amiodarone on today's EKG  CHADS2 score is 3 (age, CHF, hypertension); Eliquis for thromboembolic risk reduction   Continue amiodarone 200 mg 5 days a week      Essential hypertension  BP goal <130/80  Controlled  Continue current medical therapy     Follow up in 6 months     Thank you very much for allowing me to participate in the care of your patient. Please do not hesitate to contact me if you have any questions.     Sincerely,    Jordy Lopez M.D  [address]  [place] [address]  Ph: [phone]  Fax: [phone]    This note was scribed in the presence of Dr Silviano Gonzalez, by Braxton Rahman RN  Physician Attestation:  The scribes documentation has been prepared under my direction and personally reviewed by me in its entirety. I confirm that the note above accurately reflects all work, treatment, procedures, and medical decision making performed by me.

## 2022-09-23 ENCOUNTER — OFFICE VISIT (OUTPATIENT)
Dept: CARDIOLOGY CLINIC | Age: 72
End: 2022-09-23
Payer: MEDICARE

## 2022-09-23 VITALS
OXYGEN SATURATION: 95 % | WEIGHT: 234 LBS | SYSTOLIC BLOOD PRESSURE: 112 MMHG | HEIGHT: 77 IN | DIASTOLIC BLOOD PRESSURE: 80 MMHG | HEART RATE: 57 BPM | BODY MASS INDEX: 27.63 KG/M2

## 2022-09-23 DIAGNOSIS — I42.8 NON-ISCHEMIC CARDIOMYOPATHY (HCC): Primary | ICD-10-CM

## 2022-09-23 DIAGNOSIS — I48.0 PAF (PAROXYSMAL ATRIAL FIBRILLATION) (HCC): ICD-10-CM

## 2022-09-23 DIAGNOSIS — I10 ESSENTIAL HYPERTENSION: ICD-10-CM

## 2022-09-23 PROCEDURE — G8417 CALC BMI ABV UP PARAM F/U: HCPCS | Performed by: INTERNAL MEDICINE

## 2022-09-23 PROCEDURE — 93000 ELECTROCARDIOGRAM COMPLETE: CPT | Performed by: INTERNAL MEDICINE

## 2022-09-23 PROCEDURE — 99214 OFFICE O/P EST MOD 30 MIN: CPT | Performed by: INTERNAL MEDICINE

## 2022-09-23 PROCEDURE — 1123F ACP DISCUSS/DSCN MKR DOCD: CPT | Performed by: INTERNAL MEDICINE

## 2022-09-23 PROCEDURE — 3017F COLORECTAL CA SCREEN DOC REV: CPT | Performed by: INTERNAL MEDICINE

## 2022-09-23 PROCEDURE — G8427 DOCREV CUR MEDS BY ELIG CLIN: HCPCS | Performed by: INTERNAL MEDICINE

## 2022-09-23 PROCEDURE — 1036F TOBACCO NON-USER: CPT | Performed by: INTERNAL MEDICINE

## 2022-09-23 RX ORDER — AMIODARONE HYDROCHLORIDE 200 MG/1
TABLET ORAL
Qty: 180 TABLET | Refills: 2
Start: 2022-09-23

## 2023-01-13 ENCOUNTER — HOSPITAL ENCOUNTER (OUTPATIENT)
Dept: NON INVASIVE DIAGNOSTICS | Age: 73
Discharge: HOME | End: 2023-01-13
Payer: MEDICARE

## 2023-01-13 DIAGNOSIS — I42.8 NON-ISCHEMIC CARDIOMYOPATHY (HCC): ICD-10-CM

## 2023-01-13 LAB
LV EF: 58 %
LVEF MODALITY: NORMAL

## 2023-01-13 PROCEDURE — 93306 TTE W/DOPPLER COMPLETE: CPT

## 2023-03-09 NOTE — TELEPHONE ENCOUNTER
Last OV:  9/23/22  Next OV:  3/10/23  Last refill: 8/15/22  #180  1 r/f  Most recent Labs:  3/12/22  Last EKG (if needed): 9/23/22

## 2023-03-09 NOTE — PROGRESS NOTES
[address]  Cardiology  Note      Cooper Chouteau  1950, 71 y.o. KINGSLEY M JUSTO:    CC:  \"I am doing good\"     HPI:   Mr. Ashley Ross is a 52-year-old gentleman who we originally saw for CHF due to atrial fibrillation. He has undergone JAIMA cardioversion. He is on amiodarone and Eliquis. His EF is 45%. He has also had a stress test which was negative for ischemia in 2021      Today, he is here for follow up. He has been walking on a regular basis without limitations. He continues to work. He is inquiring how long he will need to be on Eliquis. Patient denies exertional chest pain/pressure, dyspnea at rest, GOEL, PND, orthopnea, palpitations, lightheadedness, weight changes, changes in LE edema, and syncope. Patient reports compliance to his medications. Previous note  This is a 71 y.o. male with no documented PMH who presented to [facility] with progressively worsening shortness of breath for several days associated with orthopnea and exertion. EKG indicated AF RVR . CXR showed white density - pulm edema vs Pna? BNP elevated 4011. ECHO with mod low EF 45-50%. He was started on cardizem gtt and lasix. Underwent JAMIA cardioversion with Dr. Shaji Roy. Successfully converted to SR. Pedal/ankle edema noted. Good diuresis with IV Lasix. Returned to ED 6/2021 with c/o chest pain. Troponins were negative and EKG showed sinus rhythm with no ischemic changes. Stress test was negative for ischemia        Today, presents to office for follow up. EKG today shows SR. Reports compliance with medications and tolerating them well. Denies chest pain/pressure, tightness, edema, shortness of breath, heart racing, palpitations, lightheadedness, dizziness, syncope, presyncope,  PND or orthopnea.        Allergies   Allergen Reactions    Lisinopril Other (See Comments)     DRY COUGH     Review of Systems -   Constitutional: Negative for weight gain/loss; malaise, fever  Respiratory: Negative for Asthma;  cough and hemoptysis  Cardiovascular: Negative for palpitations,dizziness   Gastrointestinal: Negative for abd.pain; constipation/diarrhea;    Genitourinary: Negative for stones; hematuria; frequency hesitancy  Integumentt: Negative for rash or pruritis  Hematologic/lymphatic: Negative for blood dyscrasia; leukemia/lymphoma  Musculoskeletal: Negative for Connective tissue disease  Neurological:  Negative for Seizure   Behavioral/Psych:Negative for Bipolar disorder, Schizophrenia; Dementia  Endocrine: negative for thyroid, parathyroid disease    Physical Examination:    There were no vitals taken for this visit. HEENT:  Face: Atraumatic, Conjunctiva: Pink; non icteric,  Mucous Memb:  Moist, No thyromegaly or Lymphadenopathy  Respiratory:  Resp Assessment: normal, Resp Auscultation: clear  Cardiovascular: Auscultation: nl S1 & S2, Palpation:  Nl PMI; No heaves or thrills, JVP:  normal  Abdomen: Soft, non-tender, Normal bowel sounds,  No organomegaly  Extremities: No Cyanosis or Clubbing  Neurological: Oriented to time, place, and person, Non-anxious  Psychiatric: Normal mood and affect  Skin: Warm and dry,  No rash seen     No outpatient medications have been marked as taking for the 3/10/23 encounter (Appointment) with Sherrie Whitlock MD.     Labs:   Lab Results   Component Value Date/Time    HDL 35 2021 05:10 AM    LDLCALC 93 2021 05:10 AM    TRIG 90 2021 05:10 AM     EK2021, sinus bradycardia rate 59  2021, sinus bradycardia, rate 57  03/3/2022, sinus bradycardia, rate 53.   2022, Sinus bradycardia  03/10/2023: Sinus bradycardia 54 bpm.    Chest X-Ray: 2021  Impression   Bibasilar airspace disease most consistent with pneumonia. ECHO: 2021  Suboptimal image quality. Normal left ventricular size and wall thickness. LVEF is difficult to assess due to arrhythmia but appears mildly reduced with global systolic dysfunction. LVEF 45-50%.  Unable to exclude regional wall motion abnormalities. Indeterminate diastolic function due to atrial fibrillation. The right ventricle is normal in size and function. Mild mitral regurgitation is present. ECHO 08/27/2021  Normal left ventricle size, wall thickness, and systolic function with an  estimated ejection fraction of 55-60%. No regional wall motion abnormalities are seen. The left atrium is mildly dilated. The aortic valve is structurally normal.    Echo: 01/13/2023  Summary  Left ventricular cavity size is normal.  Ejection fraction is visually estimated to be 55-60%. Diastolic filling parameters suggest grade I diastolic dysfunction. The left atrium is mildly dilated. The right ventricle is borderline enlarged. Right ventricular systolic function is normal.    DCCV: 04/14/2021  Successful external DC cardioversion of atrial fibrillation. Stress test: 06/16/2021  Normal stress myocardial perfusion. Overall findings represent a low risk scan. ASSESSMENT AND PLAN:    Cardiomyopathy/heart failure   NYHA Class II  LVEF is 45 to 50% (2021), LVEF improved to 55-60 % per Echo 01/123/2023  Continue GDMT; chlorthalidone, aldactone and valsartan. Stop Atenolol today due to bradycardia. Appears well compensated on today's exam.    Paroxsymal atrial fibrillation   s/p JAMIA/Cardioversion on 4/14/21; remains in sinus rhythm on amiodarone on today's EKG  CHADS2 score is 3 (age, CHF, hypertension). According to the AHA/ACC/HRS guidelines, with a [id number] score of 3, the patient is indicated for [facility] at this time. Continue Eliquis 5 mg BID for thromboembolic risk reduction   Tolerating well no signs or symptoms of abnormal bruising or bleeding. Continue amiodarone 200 mg 5 days a week with routine checking of liver and thyroid function Q4-6 months, yearly CXR and ophthalmology. -TSH 1.66 (03/03/2022)   -AST 21 ALT 25 (03/03/2022)  Check TSH, CMP and CXR.      Essential hypertension  BP goal <130/80  Controlled  Continue current medical therapy     Follow up in 6 months     Thank you very much for allowing me to participate in the care of your patient. Please do not hesitate to contact me if you have any questions. Sincerely,    Mandy Ayala M.D  [facility], [address] [place] [address] [place] [address]  Ph: [phone]  Fax: [phone]    This note was scribed in the presence of Dr. Mandy Ayala MD by Artist BABAR Stiles. Physician Attestation:  The scribes documentation has been prepared under my direction and personally reviewed by me in its entirety. I confirm that the note above accurately reflects all work, treatment, procedures, and medical decision making performed by me.

## 2023-03-10 ENCOUNTER — HOSPITAL ENCOUNTER (OUTPATIENT)
Dept: GENERAL RADIOLOGY | Age: 73
Discharge: HOME | End: 2023-03-10
Payer: MEDICARE

## 2023-03-10 ENCOUNTER — HOSPITAL ENCOUNTER (OUTPATIENT)
Age: 73
Discharge: HOME | End: 2023-03-10
Payer: MEDICARE

## 2023-03-10 ENCOUNTER — OFFICE VISIT (OUTPATIENT)
Dept: CARDIOLOGY CLINIC | Age: 73
End: 2023-03-10
Payer: MEDICARE

## 2023-03-10 VITALS
HEART RATE: 60 BPM | WEIGHT: 239.2 LBS | OXYGEN SATURATION: 96 % | BODY MASS INDEX: 28.24 KG/M2 | HEIGHT: 77 IN | DIASTOLIC BLOOD PRESSURE: 60 MMHG | SYSTOLIC BLOOD PRESSURE: 110 MMHG

## 2023-03-10 DIAGNOSIS — I48.0 PAF (PAROXYSMAL ATRIAL FIBRILLATION) (HCC): ICD-10-CM

## 2023-03-10 DIAGNOSIS — I42.8 NON-ISCHEMIC CARDIOMYOPATHY (HCC): Primary | ICD-10-CM

## 2023-03-10 DIAGNOSIS — I10 ESSENTIAL HYPERTENSION: ICD-10-CM

## 2023-03-10 DIAGNOSIS — Z79.01 CURRENT USE OF ANTICOAGULANT THERAPY: ICD-10-CM

## 2023-03-10 DIAGNOSIS — I50.22 CHRONIC SYSTOLIC HEART FAILURE (HCC): ICD-10-CM

## 2023-03-10 LAB
A/G RATIO: 1.3 (ref 1.1–2.2)
ALBUMIN SERPL-MCNC: 4.1 G/DL (ref 3.4–5)
ALP BLD-CCNC: 101 U/L (ref 40–129)
ALT SERPL-CCNC: 13 U/L (ref 10–40)
ANION GAP SERPL CALCULATED.3IONS-SCNC: 12 MMOL/L (ref 3–16)
AST SERPL-CCNC: 17 U/L (ref 15–37)
BILIRUB SERPL-MCNC: 0.6 MG/DL (ref 0–1)
BUN BLDV-MCNC: 24 MG/DL (ref 7–20)
CALCIUM SERPL-MCNC: 9.4 MG/DL (ref 8.3–10.6)
CHLORIDE BLD-SCNC: 101 MMOL/L (ref 99–110)
CO2: 27 MMOL/L (ref 21–32)
CREAT SERPL-MCNC: 1.5 MG/DL (ref 0.8–1.3)
GFR SERPL CREATININE-BSD FRML MDRD: 49 ML/MIN/{1.73_M2}
GLUCOSE BLD-MCNC: 105 MG/DL (ref 70–99)
POTASSIUM SERPL-SCNC: 4.5 MMOL/L (ref 3.5–5.1)
SODIUM BLD-SCNC: 140 MMOL/L (ref 136–145)
TOTAL PROTEIN: 7.3 G/DL (ref 6.4–8.2)
TSH REFLEX FT4: 1.36 UIU/ML (ref 0.27–4.2)

## 2023-03-10 PROCEDURE — 99214 OFFICE O/P EST MOD 30 MIN: CPT | Performed by: INTERNAL MEDICINE

## 2023-03-10 PROCEDURE — G8417 CALC BMI ABV UP PARAM F/U: HCPCS | Performed by: INTERNAL MEDICINE

## 2023-03-10 PROCEDURE — 3017F COLORECTAL CA SCREEN DOC REV: CPT | Performed by: INTERNAL MEDICINE

## 2023-03-10 PROCEDURE — 71046 X-RAY EXAM CHEST 2 VIEWS: CPT

## 2023-03-10 PROCEDURE — 1123F ACP DISCUSS/DSCN MKR DOCD: CPT | Performed by: INTERNAL MEDICINE

## 2023-03-10 PROCEDURE — 3074F SYST BP LT 130 MM HG: CPT | Performed by: INTERNAL MEDICINE

## 2023-03-10 PROCEDURE — 3078F DIAST BP <80 MM HG: CPT | Performed by: INTERNAL MEDICINE

## 2023-03-10 PROCEDURE — 1036F TOBACCO NON-USER: CPT | Performed by: INTERNAL MEDICINE

## 2023-03-10 PROCEDURE — G8484 FLU IMMUNIZE NO ADMIN: HCPCS | Performed by: INTERNAL MEDICINE

## 2023-03-10 PROCEDURE — 93000 ELECTROCARDIOGRAM COMPLETE: CPT | Performed by: INTERNAL MEDICINE

## 2023-03-10 PROCEDURE — G8427 DOCREV CUR MEDS BY ELIG CLIN: HCPCS | Performed by: INTERNAL MEDICINE

## 2023-03-10 RX ORDER — APIXABAN 5 MG/1
TABLET, FILM COATED ORAL
Qty: 180 TABLET | Refills: 1 | Status: SHIPPED | OUTPATIENT
Start: 2023-03-10

## 2023-07-25 NOTE — TELEPHONE ENCOUNTER
Last OV: 3/10/23  Next OV: 9/11/23  Last refill: 7/21/22  #90  3 R/F  Most recent Labs: 3/10/23  Last EKG (if needed): 3/10/23

## 2023-07-26 RX ORDER — VALSARTAN 160 MG/1
160 TABLET ORAL DAILY
Qty: 90 TABLET | Refills: 3 | Status: SHIPPED | OUTPATIENT
Start: 2023-07-26

## 2023-08-31 RX ORDER — ATENOLOL 50 MG/1
50 TABLET ORAL DAILY
Qty: 90 TABLET | Refills: 3 | OUTPATIENT
Start: 2023-08-31

## 2023-08-31 RX ORDER — CHLORTHALIDONE 25 MG/1
25 TABLET ORAL DAILY
Qty: 90 TABLET | Refills: 3 | OUTPATIENT
Start: 2023-08-31

## 2023-08-31 RX ORDER — SPIRONOLACTONE 25 MG/1
25 TABLET ORAL DAILY
Qty: 90 TABLET | Refills: 3 | OUTPATIENT
Start: 2023-08-31

## 2023-08-31 RX ORDER — AMIODARONE HYDROCHLORIDE 200 MG/1
TABLET ORAL
Qty: 90 TABLET | Refills: 0 | Status: SHIPPED | OUTPATIENT
Start: 2023-08-31

## 2023-09-08 NOTE — TELEPHONE ENCOUNTER
Last OV: 3/10/23  Next OV: 9/11/23  Last refill: 3/10/23  #180  1 R/F  Most recent Labs: 3/10/23  Last EKG (if needed): 3/10/23

## 2023-09-11 ENCOUNTER — OFFICE VISIT (OUTPATIENT)
Dept: CARDIOLOGY CLINIC | Age: 73
End: 2023-09-11
Payer: MEDICARE

## 2023-09-11 VITALS
OXYGEN SATURATION: 97 % | WEIGHT: 250 LBS | HEART RATE: 78 BPM | SYSTOLIC BLOOD PRESSURE: 118 MMHG | DIASTOLIC BLOOD PRESSURE: 70 MMHG | BODY MASS INDEX: 29.65 KG/M2

## 2023-09-11 DIAGNOSIS — I42.8 NON-ISCHEMIC CARDIOMYOPATHY (HCC): ICD-10-CM

## 2023-09-11 DIAGNOSIS — I48.0 PAF (PAROXYSMAL ATRIAL FIBRILLATION) (HCC): Primary | ICD-10-CM

## 2023-09-11 DIAGNOSIS — Z79.899 LONG TERM CURRENT USE OF AMIODARONE: ICD-10-CM

## 2023-09-11 DIAGNOSIS — N18.9 CHRONIC KIDNEY DISEASE, UNSPECIFIED CKD STAGE: ICD-10-CM

## 2023-09-11 DIAGNOSIS — I10 ESSENTIAL HYPERTENSION: ICD-10-CM

## 2023-09-11 DIAGNOSIS — I50.22 CHRONIC SYSTOLIC HEART FAILURE (HCC): ICD-10-CM

## 2023-09-11 LAB
ANION GAP SERPL CALCULATED.3IONS-SCNC: 8 MMOL/L (ref 3–16)
BUN SERPL-MCNC: 24 MG/DL (ref 7–20)
CALCIUM SERPL-MCNC: 9.6 MG/DL (ref 8.3–10.6)
CHLORIDE SERPL-SCNC: 101 MMOL/L (ref 99–110)
CO2 SERPL-SCNC: 29 MMOL/L (ref 21–32)
CREAT SERPL-MCNC: 1.3 MG/DL (ref 0.8–1.3)
GFR SERPLBLD CREATININE-BSD FMLA CKD-EPI: 58 ML/MIN/{1.73_M2}
GLUCOSE SERPL-MCNC: 109 MG/DL (ref 70–99)
POTASSIUM SERPL-SCNC: 4.5 MMOL/L (ref 3.5–5.1)
SODIUM SERPL-SCNC: 138 MMOL/L (ref 136–145)

## 2023-09-11 PROCEDURE — 1123F ACP DISCUSS/DSCN MKR DOCD: CPT | Performed by: INTERNAL MEDICINE

## 2023-09-11 PROCEDURE — 93000 ELECTROCARDIOGRAM COMPLETE: CPT | Performed by: INTERNAL MEDICINE

## 2023-09-11 PROCEDURE — 3074F SYST BP LT 130 MM HG: CPT | Performed by: INTERNAL MEDICINE

## 2023-09-11 PROCEDURE — 1036F TOBACCO NON-USER: CPT | Performed by: INTERNAL MEDICINE

## 2023-09-11 PROCEDURE — G8427 DOCREV CUR MEDS BY ELIG CLIN: HCPCS | Performed by: INTERNAL MEDICINE

## 2023-09-11 PROCEDURE — 3017F COLORECTAL CA SCREEN DOC REV: CPT | Performed by: INTERNAL MEDICINE

## 2023-09-11 PROCEDURE — 99214 OFFICE O/P EST MOD 30 MIN: CPT | Performed by: INTERNAL MEDICINE

## 2023-09-11 PROCEDURE — G8417 CALC BMI ABV UP PARAM F/U: HCPCS | Performed by: INTERNAL MEDICINE

## 2023-09-11 PROCEDURE — 3078F DIAST BP <80 MM HG: CPT | Performed by: INTERNAL MEDICINE

## 2023-09-11 RX ORDER — APIXABAN 5 MG/1
TABLET, FILM COATED ORAL
Qty: 180 TABLET | Refills: 1 | Status: SHIPPED | OUTPATIENT
Start: 2023-09-11

## 2023-09-11 NOTE — PROGRESS NOTES
[facility]  Cardiology progress note      Julianna Alvarez  1950, 68 y.o. Geraldo Yuan, DO:    CC:  \"I have no symptoms but my weight is up. \"     HPI: . Gini Zaragoza who we originally saw for CHF due to atrial fibrillation. He has undergone JAMIA cardioversion. He is on amiodarone and Eliquis. His EF is 45%. He has also had a stress test which was negative for ischemia in 2021. This is a 68 y.o. male with no documented PMH who presented to [facility] with progressively worsening shortness of breath for several days associated with orthopnea and exertion. EKG indicated AF RVR . CXR showed white density - pulm edema vs Pna? BNP elevated 4011. ECHO with mod low EF 45-50%. He was started on cardizem gtt and lasix. Underwent JAMIA cardioversion with Dr. Alejo Mahajan. Successfully converted to SR. Pedal/ankle edema noted. Good diuresis with IV Lasix. Returned to ED 6/2021 with c/o chest pain. Troponins were negative and EKG showed sinus rhythm with no ischemic changes. Stress test was negative for ischemia. Today, he presents for 6 month follow up. Overall he reports he is doing well and feeling well from a cardiac standpoint with no specific cardiac complaints, however, he notes at least a 10# weight gain since our last visit. He reduced his walking program. Patient denies exertional chest pain/pressure, dyspnea at rest, GOEL, PND, orthopnea, palpitations, lightheadedness, weight changes, changes in LE edema, and syncope. The patient admits to medical therapy compliance and feels he is tolerating.         Allergies   Allergen Reactions    Lisinopril Other (See Comments)     DRY COUGH     Review of Systems -   Constitutional: Negative for weight gain/loss; malaise, fever  Respiratory: Negative for Asthma;  cough and hemoptysis  Cardiovascular: Negative for palpitations,dizziness   Gastrointestinal: Negative for abd.pain; constipation/diarrhea;    Genitourinary: Negative for stones; hematuria;

## 2023-11-10 RX ORDER — AMIODARONE HYDROCHLORIDE 200 MG/1
TABLET ORAL
Qty: 90 TABLET | Refills: 3 | Status: SHIPPED | OUTPATIENT
Start: 2023-11-10

## 2023-11-10 RX ORDER — SPIRONOLACTONE 25 MG/1
25 TABLET ORAL DAILY
Qty: 90 TABLET | Refills: 3 | Status: SHIPPED | OUTPATIENT
Start: 2023-11-10

## 2023-11-10 RX ORDER — CHLORTHALIDONE 25 MG/1
25 TABLET ORAL DAILY
Qty: 90 TABLET | Refills: 3 | Status: SHIPPED | OUTPATIENT
Start: 2023-11-10

## 2023-11-10 RX ORDER — ATENOLOL 50 MG/1
50 TABLET ORAL DAILY
Qty: 90 TABLET | Refills: 3 | OUTPATIENT
Start: 2023-11-10

## 2023-11-10 NOTE — TELEPHONE ENCOUNTER
Last OV: 9/11/23  Next OV: X DUE  6-7 MONTHS  Last refill:8/31/23, 8/1/22  Most recent Labs:  9/11/23,  3/10/23  TSH  Last EKG (if needed): 9/11/23        Called patient left message to remind to get TSH lab done at any [facility] facility prior to refills.

## 2023-11-18 DIAGNOSIS — Z79.899 LONG TERM CURRENT USE OF AMIODARONE: ICD-10-CM

## 2023-11-18 DIAGNOSIS — I48.0 PAF (PAROXYSMAL ATRIAL FIBRILLATION) (HCC): ICD-10-CM

## 2023-11-18 LAB
T4 FREE SERPL-MCNC: 1.7 NG/DL (ref 0.9–1.8)
TSH SERPL DL<=0.005 MIU/L-ACNC: <0.01 UIU/ML (ref 0.27–4.2)

## 2023-12-12 NOTE — PROGRESS NOTES
TSH level is very low suggesting hyperthyroidism    This is most likely secondary to amiodarone use    Since his LV function is back to normal I think I can switch him to flecainide or even consider ablation    I would like him to stop the amiodarone and repeat a TSH level in 2 months    I need him to make an appointment to see me in 2 to 3 months and we will discuss flecainide versus ablation    I called and left the exact information on his cell phone

## 2024-03-20 NOTE — PROGRESS NOTES
[facility]  Cardiology Note      Abimael Reaves  1950, 73 y.o.                 CC:  6 month follow up  Malorie Yuan DO          HPI:   Mr. Conti is a 73-year-old patient who we saw initially for congestive heart failure associate with atrial fibrillation with RVR.  His EF was 45 to 50%.  He underwent JAMIA cardioversion.  Since then he has been on amiodarone and Eliquis and done well.  He is also had a stress nuclear scan for chest pain which was negative for ischemia.  LVEF has improved to 55 to 60%.  Presently the patient is on chlorthalidone, valsartan, spironolactone Eliquis .  The patient states that he is not taking amiodarone for the last 6 months.  Looking back I do not see any record of me discontinuing it.  I do not know how it was but he remains in sinus rhythm on EKG today.      I will  Past Medical History:   Diagnosis Date    Atrial fibrillation (HCC) 08/2021    Dr. Avila    CKD (chronic kidney disease)     ckd-stg 3    Dysphagia     Hypertension      Past Surgical History:   Procedure Laterality Date    COLONOSCOPY      COLONOSCOPY N/A 10/4/2021    COLONOSCOPY WITH BIOPSY performed by Nirav Magaña MD at [facility] ENDOSCOPY     Family History   Problem Relation Age of Onset    Cancer Mother         lung    Stroke Father     Cancer Brother         nonhodgkin lymphoma     Social History     Tobacco Use    Smoking status: Never    Smokeless tobacco: Never   Vaping Use    Vaping Use: Never used   Substance Use Topics    Alcohol use: Not Currently    Drug use: Never     Allergies   Allergen Reactions    Lisinopril Other (See Comments)     DRY COUGH     Review of Systems -   Constitutional: Negative for weight gain/loss; malaise, fever  Respiratory: Negative for Asthma;  cough and hemoptysis  Cardiovascular: Negative for palpitations,dizziness   Gastrointestinal: Negative for abd.pain; constipation/diarrhea;    Genitourinary: Negative for stones; hematuria; frequency hesitancy  Integumentt:

## 2024-03-22 ENCOUNTER — OFFICE VISIT (OUTPATIENT)
Dept: CARDIOLOGY CLINIC | Age: 74
End: 2024-03-22
Payer: MEDICARE

## 2024-03-22 VITALS
HEART RATE: 75 BPM | WEIGHT: 244 LBS | HEIGHT: 77 IN | BODY MASS INDEX: 28.81 KG/M2 | OXYGEN SATURATION: 96 % | DIASTOLIC BLOOD PRESSURE: 70 MMHG | SYSTOLIC BLOOD PRESSURE: 110 MMHG

## 2024-03-22 DIAGNOSIS — I48.91 ATRIAL FIBRILLATION WITH RVR (HCC): Primary | ICD-10-CM

## 2024-03-22 PROCEDURE — G8417 CALC BMI ABV UP PARAM F/U: HCPCS | Performed by: INTERNAL MEDICINE

## 2024-03-22 PROCEDURE — 93000 ELECTROCARDIOGRAM COMPLETE: CPT | Performed by: INTERNAL MEDICINE

## 2024-03-22 PROCEDURE — 1123F ACP DISCUSS/DSCN MKR DOCD: CPT | Performed by: INTERNAL MEDICINE

## 2024-03-22 PROCEDURE — 1036F TOBACCO NON-USER: CPT | Performed by: INTERNAL MEDICINE

## 2024-03-22 PROCEDURE — G8484 FLU IMMUNIZE NO ADMIN: HCPCS | Performed by: INTERNAL MEDICINE

## 2024-03-22 PROCEDURE — 99214 OFFICE O/P EST MOD 30 MIN: CPT | Performed by: INTERNAL MEDICINE

## 2024-03-22 PROCEDURE — G8427 DOCREV CUR MEDS BY ELIG CLIN: HCPCS | Performed by: INTERNAL MEDICINE

## 2024-03-22 PROCEDURE — 3017F COLORECTAL CA SCREEN DOC REV: CPT | Performed by: INTERNAL MEDICINE

## 2024-04-08 NOTE — TELEPHONE ENCOUNTER
Last OV: 3/22/24  Next OV: 09/13/24  Last refill:09/11/23  Most recent Labs: 09/11/23 BMP  Last EKG (if needed):N/A

## 2024-04-09 RX ORDER — APIXABAN 5 MG/1
TABLET, FILM COATED ORAL
Qty: 180 TABLET | Refills: 1 | Status: SHIPPED | OUTPATIENT
Start: 2024-04-09

## 2024-04-10 RX ORDER — VALSARTAN 160 MG/1
160 TABLET ORAL DAILY
Qty: 90 TABLET | Refills: 3 | Status: SHIPPED | OUTPATIENT
Start: 2024-04-10

## 2024-04-10 NOTE — TELEPHONE ENCOUNTER
Last O/V:  3/22/24  Next O/V:  9/13/24   Last Refill: 7/26/23   Last Labs:  BMP:  9/11/23  Last EKG: 3/22/24

## 2024-09-13 ENCOUNTER — OFFICE VISIT (OUTPATIENT)
Dept: CARDIOLOGY CLINIC | Age: 74
End: 2024-09-13
Payer: MEDICARE

## 2024-09-13 VITALS
SYSTOLIC BLOOD PRESSURE: 132 MMHG | BODY MASS INDEX: 31.54 KG/M2 | HEART RATE: 80 BPM | WEIGHT: 266 LBS | DIASTOLIC BLOOD PRESSURE: 84 MMHG | OXYGEN SATURATION: 96 %

## 2024-09-13 DIAGNOSIS — E78.49 OTHER HYPERLIPIDEMIA: ICD-10-CM

## 2024-09-13 DIAGNOSIS — I48.91 ATRIAL FIBRILLATION WITH RVR (HCC): Primary | ICD-10-CM

## 2024-09-13 DIAGNOSIS — I48.91 ATRIAL FIBRILLATION WITH RVR (HCC): ICD-10-CM

## 2024-09-13 LAB
ALBUMIN SERPL-MCNC: 4.1 G/DL (ref 3.4–5)
ALBUMIN/GLOB SERPL: 1.5 {RATIO} (ref 1.1–2.2)
ALP SERPL-CCNC: 109 U/L (ref 40–129)
ALT SERPL-CCNC: 25 U/L (ref 10–40)
ANION GAP SERPL CALCULATED.3IONS-SCNC: 10 MMOL/L (ref 3–16)
AST SERPL-CCNC: 22 U/L (ref 15–37)
BILIRUB SERPL-MCNC: 0.4 MG/DL (ref 0–1)
BUN SERPL-MCNC: 29 MG/DL (ref 7–20)
CALCIUM SERPL-MCNC: 9.7 MG/DL (ref 8.3–10.6)
CHLORIDE SERPL-SCNC: 102 MMOL/L (ref 99–110)
CHOLEST SERPL-MCNC: 222 MG/DL (ref 0–199)
CO2 SERPL-SCNC: 27 MMOL/L (ref 21–32)
CREAT SERPL-MCNC: 1.1 MG/DL (ref 0.8–1.3)
GFR SERPLBLD CREATININE-BSD FMLA CKD-EPI: 71 ML/MIN/{1.73_M2}
GLUCOSE SERPL-MCNC: 100 MG/DL (ref 70–99)
HDLC SERPL-MCNC: 54 MG/DL (ref 40–60)
LDLC SERPL CALC-MCNC: 138 MG/DL
POTASSIUM SERPL-SCNC: 4.5 MMOL/L (ref 3.5–5.1)
PROT SERPL-MCNC: 6.9 G/DL (ref 6.4–8.2)
SODIUM SERPL-SCNC: 139 MMOL/L (ref 136–145)
TRIGL SERPL-MCNC: 151 MG/DL (ref 0–150)
VLDLC SERPL CALC-MCNC: 30 MG/DL

## 2024-09-13 PROCEDURE — 1036F TOBACCO NON-USER: CPT | Performed by: INTERNAL MEDICINE

## 2024-09-13 PROCEDURE — 99214 OFFICE O/P EST MOD 30 MIN: CPT | Performed by: INTERNAL MEDICINE

## 2024-09-13 PROCEDURE — 93000 ELECTROCARDIOGRAM COMPLETE: CPT | Performed by: INTERNAL MEDICINE

## 2024-09-13 PROCEDURE — 1123F ACP DISCUSS/DSCN MKR DOCD: CPT | Performed by: INTERNAL MEDICINE

## 2024-09-13 PROCEDURE — G8417 CALC BMI ABV UP PARAM F/U: HCPCS | Performed by: INTERNAL MEDICINE

## 2024-09-13 PROCEDURE — 3017F COLORECTAL CA SCREEN DOC REV: CPT | Performed by: INTERNAL MEDICINE

## 2024-09-13 PROCEDURE — G8427 DOCREV CUR MEDS BY ELIG CLIN: HCPCS | Performed by: INTERNAL MEDICINE

## 2024-09-17 DIAGNOSIS — E78.5 HYPERLIPIDEMIA LDL GOAL <100: ICD-10-CM

## 2024-09-17 DIAGNOSIS — E78.5 HYPERLIPIDEMIA LDL GOAL <100: Primary | ICD-10-CM

## 2024-09-17 RX ORDER — ROSUVASTATIN CALCIUM 20 MG/1
20 TABLET, COATED ORAL DAILY
Qty: 90 TABLET | OUTPATIENT
Start: 2024-09-17

## 2024-09-17 RX ORDER — ROSUVASTATIN CALCIUM 20 MG/1
20 TABLET, COATED ORAL DAILY
Qty: 30 TABLET | Refills: 5 | Status: SHIPPED | OUTPATIENT
Start: 2024-09-17

## 2024-10-01 RX ORDER — APIXABAN 5 MG/1
TABLET, FILM COATED ORAL
Qty: 180 TABLET | Refills: 3 | Status: SHIPPED | OUTPATIENT
Start: 2024-10-01

## 2025-01-03 RX ORDER — SPIRONOLACTONE 25 MG/1
25 TABLET ORAL DAILY
Qty: 90 TABLET | Refills: 3 | Status: SHIPPED | OUTPATIENT
Start: 2025-01-03

## 2025-01-03 RX ORDER — CHLORTHALIDONE 25 MG/1
25 TABLET ORAL DAILY
Qty: 90 TABLET | Refills: 3 | Status: SHIPPED | OUTPATIENT
Start: 2025-01-03

## 2025-03-25 NOTE — PROGRESS NOTES
[facility]  Cardiology Note      Abimael Reaves  1950, 74 y.o.      3/28/25                CC:  6 month follow up for Malorie Chandra DO    Problem list  Atrial fibrillation   Hypertension  Hyperlipidemia   Systolic heart failure       HPI:   Mr. Reaves is a 74 y.o. patient with a past medical history of congestive heart failure atrial fibrillation, hypertension, and hyperlipidemia. His EF was 45 to 50%. He underwent JAMIA cardioversion. Since then he has been on amiodarone and Eliquis and done well.  LVEF has improved to 55 to 60%. He is also had a stress nuclear scan for chest pain which was negative for ischemia.     The patient comes for a follow-up visit.  He has not had any palpitations.  He is brought the [employer] mobile device has not had to use it.  Also he was able to bring his cholesterol down by changing his eating habits.  His LDL was 138 and is now down to 98.  He is not taking rosuvastatin.  No other complaints      Past Medical History:   Diagnosis Date    Atrial fibrillation (HCC) 08/2021    Dr. Avila    CKD (chronic kidney disease)     ckd-stg 3    Dysphagia     Hypertension      Past Surgical History:   Procedure Laterality Date    COLONOSCOPY      COLONOSCOPY N/A 10/4/2021    COLONOSCOPY WITH BIOPSY performed by Nirav Magaña MD at [facility] ENDOSCOPY     Family History   Problem Relation Age of Onset    Cancer Mother         lung    Stroke Father     Cancer Brother         nonhodgkin lymphoma     Social History     Socioeconomic History    Marital status:    Tobacco Use    Smoking status: Never    Smokeless tobacco: Never   Vaping Use    Vaping status: Never Used   Substance and Sexual Activity    Alcohol use: Not Currently    Drug use: Never    Sexual activity: Not Currently     Social Drivers of Health     Tobacco Use: Low Risk  (3/14/2025)    Received from [facility]    Patient History     Smoking Tobacco Use: Never     Smokeless Tobacco Use: Never     Passive

## 2025-03-28 ENCOUNTER — OFFICE VISIT (OUTPATIENT)
Dept: CARDIOLOGY CLINIC | Age: 75
End: 2025-03-28
Payer: MEDICARE

## 2025-03-28 VITALS
WEIGHT: 270 LBS | HEART RATE: 87 BPM | OXYGEN SATURATION: 92 % | SYSTOLIC BLOOD PRESSURE: 120 MMHG | DIASTOLIC BLOOD PRESSURE: 82 MMHG | BODY MASS INDEX: 32.01 KG/M2

## 2025-03-28 DIAGNOSIS — I10 HYPERTENSION, UNSPECIFIED TYPE: Primary | ICD-10-CM

## 2025-03-28 DIAGNOSIS — E78.49 OTHER HYPERLIPIDEMIA: ICD-10-CM

## 2025-03-28 DIAGNOSIS — I48.0 PAROXYSMAL ATRIAL FIBRILLATION (HCC): ICD-10-CM

## 2025-03-28 PROCEDURE — 3074F SYST BP LT 130 MM HG: CPT | Performed by: INTERNAL MEDICINE

## 2025-03-28 PROCEDURE — 99214 OFFICE O/P EST MOD 30 MIN: CPT | Performed by: INTERNAL MEDICINE

## 2025-03-28 PROCEDURE — 1159F MED LIST DOCD IN RCRD: CPT | Performed by: INTERNAL MEDICINE

## 2025-03-28 PROCEDURE — G2211 COMPLEX E/M VISIT ADD ON: HCPCS | Performed by: INTERNAL MEDICINE

## 2025-03-28 PROCEDURE — 93000 ELECTROCARDIOGRAM COMPLETE: CPT | Performed by: INTERNAL MEDICINE

## 2025-03-28 PROCEDURE — 1123F ACP DISCUSS/DSCN MKR DOCD: CPT | Performed by: INTERNAL MEDICINE

## 2025-03-28 PROCEDURE — 3079F DIAST BP 80-89 MM HG: CPT | Performed by: INTERNAL MEDICINE

## 2025-04-04 RX ORDER — VALSARTAN 160 MG/1
160 TABLET ORAL DAILY
Qty: 90 TABLET | Refills: 3 | Status: SHIPPED | OUTPATIENT
Start: 2025-04-04

## 2025-04-04 NOTE — TELEPHONE ENCOUNTER
Last OV: 3/28/25  Next OV: 9/12/25  Last refill: 4/10/24 #90 3 R/F  Most recent Labs: 9/13/24  Last EKG (if needed): 3/28/25

## 2025-06-16 ENCOUNTER — TELEPHONE (OUTPATIENT)
Dept: CARDIOLOGY CLINIC | Age: 75
End: 2025-06-16

## 2025-06-16 DIAGNOSIS — I48.0 PAROXYSMAL ATRIAL FIBRILLATION (HCC): Primary | ICD-10-CM

## 2025-06-17 ENCOUNTER — TELEPHONE (OUTPATIENT)
Dept: CARDIOLOGY CLINIC | Age: 75
End: 2025-06-17

## 2025-06-17 NOTE — TELEPHONE ENCOUNTER
Abimael stopped in the office with a bottle of Atenolol 50mg tablets take 1 daily. He would like to know if he should take them.      He can be reached at [phone].

## 2025-06-17 NOTE — TELEPHONE ENCOUNTER
Called and spoke with patient. Per Dr. Avila's previous note 3/28/25, atenolol was stopped due to bradycardia. Patient v/u

## 2025-07-14 ENCOUNTER — OFFICE VISIT (OUTPATIENT)
Dept: CARDIOLOGY CLINIC | Age: 75
End: 2025-07-14
Payer: MEDICARE

## 2025-07-14 ENCOUNTER — TELEPHONE (OUTPATIENT)
Dept: CARDIOLOGY CLINIC | Age: 75
End: 2025-07-14

## 2025-07-14 VITALS
HEIGHT: 77 IN | DIASTOLIC BLOOD PRESSURE: 74 MMHG | HEART RATE: 110 BPM | OXYGEN SATURATION: 95 % | WEIGHT: 271.6 LBS | SYSTOLIC BLOOD PRESSURE: 116 MMHG | BODY MASS INDEX: 32.07 KG/M2

## 2025-07-14 DIAGNOSIS — I48.19 PERSISTENT ATRIAL FIBRILLATION (HCC): ICD-10-CM

## 2025-07-14 DIAGNOSIS — I48.91 ATRIAL FIBRILLATION WITH RVR (HCC): Primary | ICD-10-CM

## 2025-07-14 DIAGNOSIS — I48.19 PERSISTENT ATRIAL FIBRILLATION (HCC): Primary | ICD-10-CM

## 2025-07-14 DIAGNOSIS — I10 ESSENTIAL HYPERTENSION: ICD-10-CM

## 2025-07-14 DIAGNOSIS — R29.818 SUSPECTED SLEEP APNEA: ICD-10-CM

## 2025-07-14 DIAGNOSIS — I42.8 NON-ISCHEMIC CARDIOMYOPATHY (HCC): ICD-10-CM

## 2025-07-14 DIAGNOSIS — Z79.01 CURRENT USE OF ANTICOAGULANT THERAPY: ICD-10-CM

## 2025-07-14 PROCEDURE — 93000 ELECTROCARDIOGRAM COMPLETE: CPT | Performed by: INTERNAL MEDICINE

## 2025-07-14 PROCEDURE — 3078F DIAST BP <80 MM HG: CPT | Performed by: INTERNAL MEDICINE

## 2025-07-14 PROCEDURE — 1123F ACP DISCUSS/DSCN MKR DOCD: CPT | Performed by: INTERNAL MEDICINE

## 2025-07-14 PROCEDURE — 99214 OFFICE O/P EST MOD 30 MIN: CPT | Performed by: INTERNAL MEDICINE

## 2025-07-14 PROCEDURE — 3074F SYST BP LT 130 MM HG: CPT | Performed by: INTERNAL MEDICINE

## 2025-07-14 PROCEDURE — G2211 COMPLEX E/M VISIT ADD ON: HCPCS | Performed by: INTERNAL MEDICINE

## 2025-07-14 NOTE — TELEPHONE ENCOUNTER
Please reach out to patient and schedule CV with Dr Sheehan no earlier than two weeks from today' date.     Cardioversion Pre Procedure Instructions     Date:____________________________    Arrive at:_________________________    Procedure time:_____________________      The morning of your procedure you will park in the [facility] parking lot and report directly to the cath lab to check in.   At the information desk stay right and go all the way to the end of the ocampo, this will take you directly to your check in desk for the cath lab.    Pre-Procedure Instructions   Do not eat or drink anything after midnight the day of your procedure.   Take your Eliquis the morning of the procedure with sips of water. Making sure to not miss any doses.   All other medications can be taken in the morning with sips of water.   Do not use any lotions, creams or perfume the morning of procedure.   Pre-procedure lab work will need to be completed 5-7 days prior to procedure.  Please have a responsible adult to drive you home after procedure. It is recommended you do not drive for 24 hours after procedure and that someone stay with you for precautionary measures.   Cath lab will provide you with all post procedure instructions.                                                 [facility]     [facility]  [address]  [address]   [place] [address]  Phone: [phone]  The hours are Mon -Fri.  6:30 am - 5:00 pm   No appointment necessary

## 2025-07-14 NOTE — PROGRESS NOTES
[facility]   Electrophysiology Consultation     Date: 7/14/2025  Reason for Consultation: Atrial fibrillation  Consult Requesting Physician: Dayday Avila MD     CC:  \"Atrial fibrillation\"     HPI: Abimael Reaves is a 74 y.o. male with a history of retention, hyperlipidemia, diastolic heart failure, persistent atrial fibrillation, initially diagnosed in April 2021, cardioversion at that time, cardiac monitor 6/19 through 7/1 with 100% atrial fibrillation, average , maximum 164, no symptoms    Patient presents to the office today as a new patient for evaluation and management of persistent atrial fibrillation, EKG today notes atrial fibrillation with RVR, rate of 110, symptomatic with fatigue, denies palpitations or racing heart, had previously noted dyspnea with exertion and atrial fibrillation.    Review of System:  [x] Full ROS obtained and negative except as mentioned in HPI or below      Prior to Admission medications    Medication Sig Start Date End Date Taking? Authorizing Provider   metoprolol succinate (TOPROL XL) 50 MG extended release tablet Take 1 tablet by mouth daily 7/10/25   [place], Dayday LIVINGSTON MD   valsartan (DIOVAN) 160 MG tablet TAKE 1 TABLET BY MOUTH DAILY 4/4/25   [place], Dayday LIVINGSTON MD   spironolactone (ALDACTONE) 25 MG tablet TAKE 1 TABLET BY MOUTH DAILY 1/3/25   [place], Dayday LIVINGSTON MD   chlorthalidone (HYGROTON) 25 MG tablet TAKE 1 TABLET BY MOUTH DAILY 1/3/25   [place], Dayday LIVINGSTON MD   apixaban (ELIQUIS) 5 MG TABS tablet TAKE 1 TABLET BY MOUTH TWICE DAILY 10/1/24   [place], Dayday LIVINGSTON MD       Past Medical History:   Diagnosis Date    Atrial fibrillation (HCC) 08/2021    Dr. Avila    CKD (chronic kidney disease)     ckd-stg 3    Dysphagia     Hypertension         Past Surgical History:   Procedure Laterality Date    COLONOSCOPY      COLONOSCOPY N/A 10/4/2021    COLONOSCOPY WITH BIOPSY performed by Nirav Magaña MD at [facility] ENDOSCOPY       Allergies   Allergen Reactions    Lisinopril Other (See Comments)

## 2025-07-14 NOTE — PATIENT INSTRUCTIONS
If you have any question regarding your cardioversion contact  Dr. Sheehan's Nurse Lizzy at [phone].      Cardioversion Pre Procedure Instructions     Date:____________________________    Arrive at:_________________________    Procedure time:_____________________      The morning of your procedure you will park in the [facility] parking lot and report directly to the cath lab to check in.   At the information desk stay right and go all the way to the end of the ocampo, this will take you directly to your check in desk for the cath lab.    Pre-Procedure Instructions   Do not eat or drink anything after midnight the day of your procedure.   Take your Eliquis the morning of the procedure with sips of water. Making sure to not miss any doses.   All other medications can be taken in the morning with sips of water.   Do not use any lotions, creams or perfume the morning of procedure.   Pre-procedure lab work will need to be completed 5-7 days prior to procedure.  Please have a responsible adult to drive you home after procedure. It is recommended you do not drive for 24 hours after procedure and that someone stay with you for precautionary measures.   Cath lab will provide you with all post procedure instructions.                                                 [facility]     [facility]  [address]  [address]   [place] [address]  Phone: [phone]  The hours are Mon -Fri.  6:30 am - 5:00 pm   No appointment necessary

## 2025-07-15 NOTE — TELEPHONE ENCOUNTER
Abimael returned my call this afternoon and stated that Dr. Sheehan mentioned an \"ultrasound\" that could be done before his CV. He said he would rather have the \"ultrasound\" now and not have to wait 3 weeks for his CV.   I looked in his chart and didn't see any additional testing that was supposed to be ordered, so I told him I would put a message back to Dr. Sheehan and his RN to have them clarify and would reach back out to him, he v/u.

## 2025-07-15 NOTE — TELEPHONE ENCOUNTER
Ok to schedule CV with JAMIA prior and will not have to wait two weeks to do the CV.  JAMIA is needed due to missed doses of anticoagulation.   New order placed.

## 2025-07-16 DIAGNOSIS — I48.91 ATRIAL FIBRILLATION WITH RVR (HCC): ICD-10-CM

## 2025-07-16 LAB
ANION GAP SERPL CALCULATED.3IONS-SCNC: 13 MMOL/L (ref 3–16)
BUN SERPL-MCNC: 28 MG/DL (ref 7–20)
CALCIUM SERPL-MCNC: 9.3 MG/DL (ref 8.3–10.6)
CHLORIDE SERPL-SCNC: 103 MMOL/L (ref 99–110)
CO2 SERPL-SCNC: 24 MMOL/L (ref 21–32)
CREAT SERPL-MCNC: 1.3 MG/DL (ref 0.8–1.3)
GFR SERPLBLD CREATININE-BSD FMLA CKD-EPI: 58 ML/MIN/{1.73_M2}
GLUCOSE SERPL-MCNC: 106 MG/DL (ref 70–99)
POTASSIUM SERPL-SCNC: 4.4 MMOL/L (ref 3.5–5.1)
SODIUM SERPL-SCNC: 140 MMOL/L (ref 136–145)

## 2025-07-17 LAB
DEPRECATED RDW RBC AUTO: 13.8 % (ref 12.4–15.4)
HCT VFR BLD AUTO: 44.4 % (ref 40.5–52.5)
HGB BLD-MCNC: 15.1 G/DL (ref 13.5–17.5)
MCH RBC QN AUTO: 30.5 PG (ref 26–34)
MCHC RBC AUTO-ENTMCNC: 34.1 G/DL (ref 31–36)
MCV RBC AUTO: 89.5 FL (ref 80–100)
PLATELET # BLD AUTO: 207 K/UL (ref 135–450)
PMV BLD AUTO: 9.2 FL (ref 5–10.5)
RBC # BLD AUTO: 4.96 M/UL (ref 4.2–5.9)
WBC # BLD AUTO: 6.4 K/UL (ref 4–11)

## 2025-07-17 NOTE — TELEPHONE ENCOUNTER
Date of Procedure: Thursday 7/24/25 @ [facility] with Dr. Sheehan     Time of arrival: 12:00 pm     Procedure time: 1:00 pm     Called and spoke to Abimael and he is agreeable to date and time. Reviewed instructions and he verbalized understanding. Encouraged to call with any questions or concerns.     Published on [employer] and e-mail to Lorraine.

## 2025-07-24 ENCOUNTER — HOSPITAL ENCOUNTER (OUTPATIENT)
Age: 75
Discharge: HOME | End: 2025-07-26
Attending: INTERNAL MEDICINE
Payer: MEDICARE

## 2025-07-24 VITALS
RESPIRATION RATE: 14 BRPM | HEART RATE: 79 BPM | OXYGEN SATURATION: 96 % | WEIGHT: 271 LBS | SYSTOLIC BLOOD PRESSURE: 111 MMHG | BODY MASS INDEX: 33.69 KG/M2 | DIASTOLIC BLOOD PRESSURE: 69 MMHG | TEMPERATURE: 98.4 F | HEIGHT: 75 IN

## 2025-07-24 DIAGNOSIS — I48.19 PERSISTENT ATRIAL FIBRILLATION (HCC): ICD-10-CM

## 2025-07-24 LAB
ECHO AO ASC DIAM: 3.8 CM
ECHO AO ASCENDING AORTA INDEX: 1.52 CM/M2
ECHO BSA: 2.55 M2

## 2025-07-24 PROCEDURE — 99152 MOD SED SAME PHYS/QHP 5/>YRS: CPT | Performed by: INTERNAL MEDICINE

## 2025-07-24 PROCEDURE — 92960 CARDIOVERSION ELECTRIC EXT: CPT | Performed by: INTERNAL MEDICINE

## 2025-07-24 PROCEDURE — 2580000003 HC RX 258: Performed by: INTERNAL MEDICINE

## 2025-07-24 PROCEDURE — 7100000011 HC PHASE II RECOVERY - ADDTL 15 MIN: Performed by: INTERNAL MEDICINE

## 2025-07-24 PROCEDURE — 93312 ECHO TRANSESOPHAGEAL: CPT

## 2025-07-24 PROCEDURE — 99153 MOD SED SAME PHYS/QHP EA: CPT | Performed by: INTERNAL MEDICINE

## 2025-07-24 PROCEDURE — 93312 ECHO TRANSESOPHAGEAL: CPT | Performed by: INTERNAL MEDICINE

## 2025-07-24 PROCEDURE — 7100000010 HC PHASE II RECOVERY - FIRST 15 MIN: Performed by: INTERNAL MEDICINE

## 2025-07-24 PROCEDURE — 93325 DOPPLER ECHO COLOR FLOW MAPG: CPT | Performed by: INTERNAL MEDICINE

## 2025-07-24 PROCEDURE — 6360000002 HC RX W HCPCS: Performed by: INTERNAL MEDICINE

## 2025-07-24 PROCEDURE — 6370000000 HC RX 637 (ALT 250 FOR IP): Performed by: INTERNAL MEDICINE

## 2025-07-24 PROCEDURE — 2500000003 HC RX 250 WO HCPCS: Performed by: INTERNAL MEDICINE

## 2025-07-24 RX ORDER — SODIUM CHLORIDE 0.9 % (FLUSH) 0.9 %
5-40 SYRINGE (ML) INJECTION PRN
Status: DISCONTINUED | OUTPATIENT
Start: 2025-07-24 | End: 2025-07-27 | Stop reason: HOSPADM

## 2025-07-24 RX ORDER — LIDOCAINE HYDROCHLORIDE 20 MG/ML
SOLUTION OROPHARYNGEAL PRN
Status: COMPLETED | OUTPATIENT
Start: 2025-07-24 | End: 2025-07-24

## 2025-07-24 RX ORDER — SODIUM CHLORIDE 0.9 % (FLUSH) 0.9 %
5-40 SYRINGE (ML) INJECTION EVERY 12 HOURS SCHEDULED
Status: DISCONTINUED | OUTPATIENT
Start: 2025-07-24 | End: 2025-07-27 | Stop reason: HOSPADM

## 2025-07-24 RX ORDER — SODIUM CHLORIDE 9 MG/ML
INJECTION, SOLUTION INTRAVENOUS PRN
Status: DISCONTINUED | OUTPATIENT
Start: 2025-07-24 | End: 2025-07-27 | Stop reason: HOSPADM

## 2025-07-24 RX ORDER — FENTANYL CITRATE 50 UG/ML
INJECTION, SOLUTION INTRAMUSCULAR; INTRAVENOUS PRN
Status: COMPLETED | OUTPATIENT
Start: 2025-07-24 | End: 2025-07-24

## 2025-07-24 RX ORDER — MIDAZOLAM HYDROCHLORIDE 1 MG/ML
INJECTION, SOLUTION INTRAMUSCULAR; INTRAVENOUS PRN
Status: COMPLETED | OUTPATIENT
Start: 2025-07-24 | End: 2025-07-24

## 2025-07-24 RX ADMIN — MIDAZOLAM 1 MG: 1 INJECTION INTRAMUSCULAR; INTRAVENOUS at 12:49

## 2025-07-24 RX ADMIN — FENTANYL CITRATE 50 MCG: 50 INJECTION INTRAMUSCULAR; INTRAVENOUS at 12:39

## 2025-07-24 RX ADMIN — FENTANYL CITRATE 25 MCG: 50 INJECTION INTRAMUSCULAR; INTRAVENOUS at 12:43

## 2025-07-24 RX ADMIN — FENTANYL CITRATE 25 MCG: 50 INJECTION INTRAMUSCULAR; INTRAVENOUS at 12:49

## 2025-07-24 RX ADMIN — METHOHEXITAL SODIUM 40 MG: 500 INJECTION, POWDER, LYOPHILIZED, FOR SOLUTION INTRAMUSCULAR; INTRAVENOUS; RECTAL at 12:59

## 2025-07-24 RX ADMIN — MIDAZOLAM 2 MG: 1 INJECTION INTRAMUSCULAR; INTRAVENOUS at 12:43

## 2025-07-24 RX ADMIN — SODIUM CHLORIDE 75 ML/HR: 9 INJECTION, SOLUTION INTRAVENOUS at 12:30

## 2025-07-24 RX ADMIN — LIDOCAINE HYDROCHLORIDE 15 ML: 20 SOLUTION ORAL; TOPICAL at 12:37

## 2025-07-24 RX ADMIN — MIDAZOLAM 2 MG: 1 INJECTION INTRAMUSCULAR; INTRAVENOUS at 12:38

## 2025-07-24 NOTE — DISCHARGE INSTRUCTIONS
TRANSESOPHAGEAL ECHOCARDIOGRAM (JAMIA) WITH CARDIOVERSION      No driving for 24 hours after procedure  Do not make important / legal decisions within 24 hours after procedure  Advance your diet as tolerated  May use 1% hydrocortisone cream or aloe to chest redness if necessary  Continue all of your medication as directed  Continue your blood thinner  Call your doctor for the following symptoms:   -Fever   -Difficulty swallowing   -Chest pain   -Difficulty breathing   -Coughing up blood or bloody sputum    Follow up with your doctor  EKG in 2 weeks at the office

## 2025-07-24 NOTE — H&P
[facility]          Electrophysiology H&P Note       Date of Procedure: 7/24/2025  Patient's Name: Abimael Reaves  YOB: 1950   Medical Record Number: [id number]    Indication:  Atrial fibrillation  Cardioversion    Consent:   I have discussed with the patient and/or the patient representative the indication, alternatives, and the possible risks and/or complications of the planned procedure and the anesthesia methods. The patient and/or patient representative appear to understand and agree to proceed.    Past Medical History:   Diagnosis Date    Atrial fibrillation (HCC) 08/2021    Dr. Avila    CKD (chronic kidney disease)     ckd-stg 3    Dysphagia     Hypertension        Past Surgical History:   Procedure Laterality Date    COLONOSCOPY      COLONOSCOPY N/A 10/4/2021    COLONOSCOPY WITH BIOPSY performed by Nirav Magaña MD at [facility] ENDOSCOPY       Prior to Admission medications    Medication Sig Start Date End Date Taking? Authorizing Provider   valsartan (DIOVAN) 160 MG tablet TAKE 1 TABLET BY MOUTH DAILY 4/4/25  Yes Dayday Avila MD   spironolactone (ALDACTONE) 25 MG tablet TAKE 1 TABLET BY MOUTH DAILY 1/3/25  Yes Dayday Avila MD   chlorthalidone (HYGROTON) 25 MG tablet TAKE 1 TABLET BY MOUTH DAILY 1/3/25  Yes Dayday Avila MD   apixaban (ELIQUIS) 5 MG TABS tablet TAKE 1 TABLET BY MOUTH TWICE DAILY 10/1/24  Yes Dayday Avila MD         Pre-sedation Documentation and Exam:  I have personally completed a history, physical exam & review of systems for this patient (see notes).    /79   Pulse (!) 125   Temp 98.4 °F (36.9 °C) (Infrared)   Resp 16   Ht 1.905 m (6' 3\")   Wt 122.9 kg (271 lb)   SpO2 93%   BMI 33.87 kg/m²   NAD  Chest clear non labored  Heart is tachycardic, irregular rhythm  Abd soft non tender  No focal neuro deficits  Appropriate mood and affect       Mallampati Airway Assessment:  II     ASA Classification:  Class III - A patient with severe systemic disease that

## 2025-07-24 NOTE — PROGRESS NOTES
1330  Awake and alert, drinking juice.  1355  Dr Sheehan at bedside discussing results of procedure with patient and plan of care.  1405  Transferred to discharge bridge via wheelchair

## 2025-08-07 ENCOUNTER — CLINICAL SUPPORT (OUTPATIENT)
Dept: CARDIOLOGY CLINIC | Age: 75
End: 2025-08-07
Payer: MEDICARE

## 2025-08-07 DIAGNOSIS — I48.19 PERSISTENT ATRIAL FIBRILLATION (HCC): Primary | ICD-10-CM

## 2025-08-07 PROCEDURE — 93000 ELECTROCARDIOGRAM COMPLETE: CPT | Performed by: INTERNAL MEDICINE

## (undated) DEVICE — FORCEPS BX 240CM 2.4MM L NDL RAD JAW 4 M00513334